# Patient Record
Sex: FEMALE | Race: WHITE | NOT HISPANIC OR LATINO | Employment: UNEMPLOYED | URBAN - METROPOLITAN AREA
[De-identification: names, ages, dates, MRNs, and addresses within clinical notes are randomized per-mention and may not be internally consistent; named-entity substitution may affect disease eponyms.]

---

## 2023-05-30 ENCOUNTER — OFFICE VISIT (OUTPATIENT)
Dept: PRIMARY CARE | Facility: CLINIC | Age: 38
End: 2023-05-30
Payer: COMMERCIAL

## 2023-05-30 VITALS
WEIGHT: 86 LBS | HEIGHT: 58 IN | SYSTOLIC BLOOD PRESSURE: 100 MMHG | BODY MASS INDEX: 18.05 KG/M2 | TEMPERATURE: 98.4 F | DIASTOLIC BLOOD PRESSURE: 68 MMHG

## 2023-05-30 DIAGNOSIS — N92.1 PROLONGED MENSTRUAL CYCLE: ICD-10-CM

## 2023-05-30 DIAGNOSIS — G43.019 INTRACTABLE MIGRAINE WITHOUT AURA AND WITHOUT STATUS MIGRAINOSUS: ICD-10-CM

## 2023-05-30 DIAGNOSIS — Z00.00 WELLNESS EXAMINATION: Primary | ICD-10-CM

## 2023-05-30 PROCEDURE — 1036F TOBACCO NON-USER: CPT | Performed by: FAMILY MEDICINE

## 2023-05-30 PROCEDURE — 99214 OFFICE O/P EST MOD 30 MIN: CPT | Performed by: FAMILY MEDICINE

## 2023-05-30 RX ORDER — SUMATRIPTAN 50 MG/1
50 TABLET, FILM COATED ORAL ONCE AS NEEDED
Qty: 27 TABLET | Refills: 3 | Status: SHIPPED | OUTPATIENT
Start: 2023-05-30 | End: 2023-11-22 | Stop reason: ALTCHOICE

## 2023-05-30 ASSESSMENT — PATIENT HEALTH QUESTIONNAIRE - PHQ9: 1. LITTLE INTEREST OR PLEASURE IN DOING THINGS: NOT AT ALL

## 2023-05-30 NOTE — PROGRESS NOTES
"Chief complaint:   Chief Complaint   Patient presents with    [facility]       HPI:  Cris Walker is a 37 y.o. female who presents for evaluation of headaches starting on the frontal side of her head then move to the bridge of her nose. The happen 3 days prior to the period which lingers for 3 days or so then resolves. The pain is 8/10 intensity. OTC medication does not help.  She started getting Migraines age 19. Her periods last for 15 days since she was young. She has had period with heavy clots. She gets her period monthly or more on average. She has been on OCP in the past. She stopped her OCP months ago.    She has 5 children and has been following with OBGYN (Dr. Ayon) (youngest child is age 3 and oldest is age 12) She is interested in having more children.     She had a ovarian cyst with rupture and needed surgical removal.     Sister in law uses tranexamic acid for heavy menses    She does have gum bleeding with flossing. No bleeding with urination, stooling, tooth brushing, or nosebleeds. She did not have issues with postpartum bleeding (had c- sections).     Physical exam:  /68 (BP Location: Left arm, Patient Position: Sitting)   Temp 36.9 °C (98.4 °F)   Ht 1.473 m (4' 10\")   Wt (!) 39 kg (86 lb)   BMI 17.97 kg/m²   General: NAD, well appearing female  Heart: RRR, no mumur appreciated  Lungs: CTAB, no wheezes, rales, rhonchi  Abdomen: soft, non tender, normoactive BS, no organomegaly  Extremities: No LE edema    Assessment/Plan   Problem List Items Addressed This Visit    None  Visit Diagnoses       Wellness examination    -  Primary    Relevant Orders    Lipid panel    CBC    Comprehensive metabolic panel    Tsh With Reflex To Free T4 If Abnormal    Intractable migraine without aura and without status migrainosus        Relevant Medications    SUMAtriptan (Imitrex) 50 mg tablet        Discussed various options for treatment. Will start with medication therapy. It is unclear if she has " ever been assessed for bleeding disorder, labs ordered to start. Sumatriptan ordered for abortive therapy. Follow up pending results.    Juliana Rangel, DO

## 2023-05-31 ENCOUNTER — LAB (OUTPATIENT)
Dept: LAB | Facility: LAB | Age: 38
End: 2023-05-31
Payer: COMMERCIAL

## 2023-05-31 DIAGNOSIS — N92.1 PROLONGED MENSTRUAL CYCLE: ICD-10-CM

## 2023-05-31 DIAGNOSIS — Z00.00 WELLNESS EXAMINATION: ICD-10-CM

## 2023-05-31 LAB
ACTIVATED PARTIAL THROMBOPLASTIN TIME IN PPP BY COAGULATION ASSAY: 31 SEC (ref 26–39)
ALANINE AMINOTRANSFERASE (SGPT) (U/L) IN SER/PLAS: 26 U/L (ref 7–45)
ALBUMIN (G/DL) IN SER/PLAS: 4.4 G/DL (ref 3.4–5)
ALKALINE PHOSPHATASE (U/L) IN SER/PLAS: 68 U/L (ref 33–110)
ANION GAP IN SER/PLAS: 13 MMOL/L (ref 10–20)
ASPARTATE AMINOTRANSFERASE (SGOT) (U/L) IN SER/PLAS: 24 U/L (ref 9–39)
BILIRUBIN TOTAL (MG/DL) IN SER/PLAS: 0.8 MG/DL (ref 0–1.2)
CALCIUM (MG/DL) IN SER/PLAS: 9.3 MG/DL (ref 8.6–10.3)
CARBON DIOXIDE, TOTAL (MMOL/L) IN SER/PLAS: 24 MMOL/L (ref 21–32)
CHLORIDE (MMOL/L) IN SER/PLAS: 106 MMOL/L (ref 98–107)
CHOLESTEROL (MG/DL) IN SER/PLAS: 205 MG/DL (ref 0–199)
CHOLESTEROL IN HDL (MG/DL) IN SER/PLAS: 58.7 MG/DL
CHOLESTEROL/HDL RATIO: 3.5
CREATININE (MG/DL) IN SER/PLAS: 0.46 MG/DL (ref 0.5–1.05)
ERYTHROCYTE DISTRIBUTION WIDTH (RATIO) BY AUTOMATED COUNT: 13.5 % (ref 11.5–14.5)
ERYTHROCYTE MEAN CORPUSCULAR HEMOGLOBIN CONCENTRATION (G/DL) BY AUTOMATED: 32 G/DL (ref 32–36)
ERYTHROCYTE MEAN CORPUSCULAR VOLUME (FL) BY AUTOMATED COUNT: 92 FL (ref 80–100)
ERYTHROCYTES (10*6/UL) IN BLOOD BY AUTOMATED COUNT: 4.33 X10E12/L (ref 4–5.2)
GFR FEMALE: >90 ML/MIN/1.73M2
GLUCOSE (MG/DL) IN SER/PLAS: 87 MG/DL (ref 74–99)
HEMATOCRIT (%) IN BLOOD BY AUTOMATED COUNT: 40 % (ref 36–46)
HEMOGLOBIN (G/DL) IN BLOOD: 12.8 G/DL (ref 12–16)
INR IN PPP BY COAGULATION ASSAY: 1.1 (ref 0.9–1.1)
LDL: 133 MG/DL (ref 0–99)
LEUKOCYTES (10*3/UL) IN BLOOD BY AUTOMATED COUNT: 4.2 X10E9/L (ref 4.4–11.3)
NRBC (PER 100 WBCS) BY AUTOMATED COUNT: 0 /100 WBC (ref 0–0)
PLATELETS (10*3/UL) IN BLOOD AUTOMATED COUNT: 206 X10E9/L (ref 150–450)
POTASSIUM (MMOL/L) IN SER/PLAS: 4.9 MMOL/L (ref 3.5–5.3)
PROTEIN TOTAL: 6.9 G/DL (ref 6.4–8.2)
PROTHROMBIN TIME (PT) IN PPP BY COAGULATION ASSAY: 13.1 SEC (ref 9.8–13.4)
SODIUM (MMOL/L) IN SER/PLAS: 138 MMOL/L (ref 136–145)
THYROTROPIN (MIU/L) IN SER/PLAS BY DETECTION LIMIT <= 0.05 MIU/L: 2.88 MIU/L (ref 0.44–3.98)
TRIGLYCERIDE (MG/DL) IN SER/PLAS: 66 MG/DL (ref 0–149)
UREA NITROGEN (MG/DL) IN SER/PLAS: 15 MG/DL (ref 6–23)
VLDL: 13 MG/DL (ref 0–40)

## 2023-05-31 PROCEDURE — 85027 COMPLETE CBC AUTOMATED: CPT

## 2023-05-31 PROCEDURE — 84443 ASSAY THYROID STIM HORMONE: CPT

## 2023-05-31 PROCEDURE — 85610 PROTHROMBIN TIME: CPT

## 2023-05-31 PROCEDURE — 36415 COLL VENOUS BLD VENIPUNCTURE: CPT

## 2023-05-31 PROCEDURE — 85730 THROMBOPLASTIN TIME PARTIAL: CPT

## 2023-05-31 PROCEDURE — 80061 LIPID PANEL: CPT

## 2023-05-31 PROCEDURE — 80053 COMPREHEN METABOLIC PANEL: CPT

## 2023-06-01 ENCOUNTER — TELEPHONE (OUTPATIENT)
Dept: PRIMARY CARE | Facility: CLINIC | Age: 38
End: 2023-06-01
Payer: COMMERCIAL

## 2023-06-01 DIAGNOSIS — N92.6 ABNORMAL MENSES: Primary | ICD-10-CM

## 2023-06-01 NOTE — TELEPHONE ENCOUNTER
Labs ok though I would like her to have evaluation by hematology to assess for cause for her prolonged bleeding.     Please try the Sumatriptan with her next migraine and follow up with me after that to discuss alternative options.

## 2023-07-14 PROBLEM — E05.90 HYPERTHYROIDISM: Status: ACTIVE | Noted: 2023-07-14

## 2023-07-14 PROBLEM — E06.3 HASHIMOTO'S THYROIDITIS: Status: ACTIVE | Noted: 2023-07-14

## 2023-07-14 PROBLEM — O21.9 NAUSEA AND VOMITING IN PREGNANCY (HHS-HCC): Status: ACTIVE | Noted: 2023-07-14

## 2023-07-14 PROBLEM — R30.9 PAIN WITH URINATION: Status: ACTIVE | Noted: 2023-07-14

## 2023-07-14 PROBLEM — N39.0 URINARY TRACT INFECTION: Status: ACTIVE | Noted: 2023-07-14

## 2023-07-14 PROBLEM — R06.02 SHORTNESS OF BREATH: Status: ACTIVE | Noted: 2023-07-14

## 2023-07-14 PROBLEM — E05.00 GRAVES DISEASE: Status: ACTIVE | Noted: 2023-07-14

## 2023-07-14 PROBLEM — R00.2 PALPITATIONS: Status: ACTIVE | Noted: 2023-07-14

## 2023-07-14 PROBLEM — N99.81 INTRAOPERATIVE BLADDER INJURY: Status: ACTIVE | Noted: 2023-07-14

## 2023-07-14 PROBLEM — R39.15 URGENCY OF URINATION: Status: ACTIVE | Noted: 2023-07-14

## 2023-07-14 PROBLEM — N93.9 ABNORMAL UTERINE BLEEDING (AUB): Status: ACTIVE | Noted: 2023-07-14

## 2023-07-14 RX ORDER — LEVONORGESTREL/ETHIN.ESTRADIOL 0.1-0.02MG
1 TABLET ORAL DAILY
COMMUNITY
Start: 2022-08-09 | End: 2023-10-18 | Stop reason: WASHOUT

## 2023-08-29 ENCOUNTER — APPOINTMENT (OUTPATIENT)
Dept: PRIMARY CARE | Facility: CLINIC | Age: 38
End: 2023-08-29
Payer: COMMERCIAL

## 2023-09-15 LAB
ABO GROUP (TYPE) IN BLOOD: NORMAL
ANTIBODY SCREEN: NORMAL
ERYTHROCYTE DISTRIBUTION WIDTH (RATIO) BY AUTOMATED COUNT: 13.3 % (ref 11.5–14.5)
ERYTHROCYTE MEAN CORPUSCULAR HEMOGLOBIN CONCENTRATION (G/DL) BY AUTOMATED: 32.8 G/DL (ref 32–36)
ERYTHROCYTE MEAN CORPUSCULAR VOLUME (FL) BY AUTOMATED COUNT: 90 FL (ref 80–100)
ERYTHROCYTES (10*6/UL) IN BLOOD BY AUTOMATED COUNT: 4.18 X10E12/L (ref 4–5.2)
ESTIMATED AVERAGE GLUCOSE FOR HBA1C: 94 MG/DL
HEMATOCRIT (%) IN BLOOD BY AUTOMATED COUNT: 37.5 % (ref 36–46)
HEMOGLOBIN (G/DL) IN BLOOD: 12.3 G/DL (ref 12–16)
HEMOGLOBIN A1C/HEMOGLOBIN TOTAL IN BLOOD: 4.9 %
HEPATITIS B VIRUS SURFACE AG PRESENCE IN SERUM: NONREACTIVE
HEPATITIS C VIRUS AB PRESENCE IN SERUM: NONREACTIVE
HIV 1/ 2 AG/AB SCREEN: NONREACTIVE
LEUKOCYTES (10*3/UL) IN BLOOD BY AUTOMATED COUNT: 5.9 X10E9/L (ref 4.4–11.3)
PLATELETS (10*3/UL) IN BLOOD AUTOMATED COUNT: 216 X10E9/L (ref 150–450)
REFLEX ADDED, ANEMIA PANEL: NORMAL
RH FACTOR: NORMAL
RUBELLA VIRUS IGG AB: POSITIVE
THYROTROPIN (MIU/L) IN SER/PLAS BY DETECTION LIMIT <= 0.05 MIU/L: 0.47 MIU/L (ref 0.44–3.98)

## 2023-09-16 LAB
CHLAMYDIA TRACH., AMPLIFIED: NEGATIVE
N. GONORRHEA, AMPLIFIED: NEGATIVE
SYPHILIS TOTAL AB: NONREACTIVE

## 2023-09-17 LAB — URINE CULTURE: NORMAL

## 2023-09-23 RX ORDER — ONDANSETRON 4 MG/1
TABLET, ORALLY DISINTEGRATING ORAL EVERY 6 HOURS
COMMUNITY
Start: 2023-08-25 | End: 2023-11-22 | Stop reason: ALTCHOICE

## 2023-09-23 RX ORDER — METOCLOPRAMIDE 5 MG/1
TABLET ORAL
COMMUNITY
Start: 2023-09-05 | End: 2023-11-22 | Stop reason: ALTCHOICE

## 2023-09-23 RX ORDER — DOXYLAMINE SUCCINATE AND PYRIDOXINE HYDROCHLORIDE, DELAYED RELEASE TABLETS 10 MG/10 MG 10; 10 MG/1; MG/1
TABLET, DELAYED RELEASE ORAL
COMMUNITY
Start: 2023-09-05 | End: 2023-10-18 | Stop reason: WASHOUT

## 2023-10-11 ENCOUNTER — ANCILLARY PROCEDURE (OUTPATIENT)
Dept: RADIOLOGY | Facility: CLINIC | Age: 38
End: 2023-10-11
Payer: COMMERCIAL

## 2023-10-11 DIAGNOSIS — Z34.81 ENCOUNTER FOR SUPERVISION OF OTHER NORMAL PREGNANCY, FIRST TRIMESTER (HHS-HCC): ICD-10-CM

## 2023-10-11 PROCEDURE — 76813 OB US NUCHAL MEAS 1 GEST: CPT | Performed by: OBSTETRICS & GYNECOLOGY

## 2023-10-11 PROCEDURE — 76813 OB US NUCHAL MEAS 1 GEST: CPT

## 2023-10-17 PROBLEM — Z98.891 H/O CESAREAN SECTION: Status: ACTIVE | Noted: 2023-10-17

## 2023-10-17 PROBLEM — E06.3 HASHIMOTO'S THYROIDITIS: Status: RESOLVED | Noted: 2023-07-14 | Resolved: 2023-10-17

## 2023-10-17 PROBLEM — R39.15 URGENCY OF URINATION: Status: RESOLVED | Noted: 2023-07-14 | Resolved: 2023-10-17

## 2023-10-17 PROBLEM — N39.0 URINARY TRACT INFECTION: Status: RESOLVED | Noted: 2023-07-14 | Resolved: 2023-10-17

## 2023-10-17 PROBLEM — E05.00 GRAVES DISEASE: Status: RESOLVED | Noted: 2023-07-14 | Resolved: 2023-10-17

## 2023-10-17 PROBLEM — N93.9 ABNORMAL UTERINE BLEEDING (AUB): Status: RESOLVED | Noted: 2023-07-14 | Resolved: 2023-10-17

## 2023-10-17 PROBLEM — R06.02 SHORTNESS OF BREATH: Status: RESOLVED | Noted: 2023-07-14 | Resolved: 2023-10-17

## 2023-10-17 PROBLEM — R00.2 PALPITATIONS: Status: RESOLVED | Noted: 2023-07-14 | Resolved: 2023-10-17

## 2023-10-17 PROBLEM — R30.9 PAIN WITH URINATION: Status: RESOLVED | Noted: 2023-07-14 | Resolved: 2023-10-17

## 2023-10-17 PROBLEM — Z34.80 SUPERVISION OF OTHER NORMAL PREGNANCY, ANTEPARTUM (HHS-HCC): Status: ACTIVE | Noted: 2023-10-17

## 2023-10-18 ENCOUNTER — ROUTINE PRENATAL (OUTPATIENT)
Dept: OBSTETRICS AND GYNECOLOGY | Facility: CLINIC | Age: 38
End: 2023-10-18
Payer: COMMERCIAL

## 2023-10-18 VITALS — SYSTOLIC BLOOD PRESSURE: 98 MMHG | WEIGHT: 93.5 LBS | BODY MASS INDEX: 19.54 KG/M2 | DIASTOLIC BLOOD PRESSURE: 60 MMHG

## 2023-10-18 DIAGNOSIS — Z3A.14 14 WEEKS GESTATION OF PREGNANCY (HHS-HCC): Primary | ICD-10-CM

## 2023-10-18 DIAGNOSIS — Z34.80 SUPERVISION OF OTHER NORMAL PREGNANCY, ANTEPARTUM (HHS-HCC): ICD-10-CM

## 2023-10-18 PROCEDURE — 99213 OFFICE O/P EST LOW 20 MIN: CPT | Performed by: OBSTETRICS & GYNECOLOGY

## 2023-10-18 NOTE — PROGRESS NOTES
Routine ob at 14.2.  Normal NT scan, declines genetics.  Got her flu shot at her new ob visit.  Has anatomy scan scheduled. RTC 4 wks.   No

## 2023-11-20 ENCOUNTER — ANCILLARY PROCEDURE (OUTPATIENT)
Dept: RADIOLOGY | Facility: CLINIC | Age: 38
End: 2023-11-20
Payer: COMMERCIAL

## 2023-11-20 DIAGNOSIS — Z36.89 SCREENING, ANTENATAL, FOR FETAL ANATOMIC SURVEY (HHS-HCC): ICD-10-CM

## 2023-11-20 PROCEDURE — 76811 OB US DETAILED SNGL FETUS: CPT

## 2023-11-20 PROCEDURE — 76811 OB US DETAILED SNGL FETUS: CPT | Performed by: OBSTETRICS & GYNECOLOGY

## 2023-11-22 ENCOUNTER — ROUTINE PRENATAL (OUTPATIENT)
Dept: OBSTETRICS AND GYNECOLOGY | Facility: CLINIC | Age: 38
End: 2023-11-22
Payer: COMMERCIAL

## 2023-11-22 VITALS — DIASTOLIC BLOOD PRESSURE: 60 MMHG | WEIGHT: 97.38 LBS | SYSTOLIC BLOOD PRESSURE: 90 MMHG | BODY MASS INDEX: 20.35 KG/M2

## 2023-11-22 DIAGNOSIS — Z3A.19 19 WEEKS GESTATION OF PREGNANCY (HHS-HCC): Primary | ICD-10-CM

## 2023-11-22 PROCEDURE — 99213 OFFICE O/P EST LOW 20 MIN: CPT | Performed by: ADVANCED PRACTICE MIDWIFE

## 2023-11-22 NOTE — PROGRESS NOTES
Subjective     Cris Walker is a 38 y.o.  at 19w2d with a working estimated date of delivery of 4/15/2024, by Last Menstrual Period who presents for a routine prenatal visit. Endorses good fetal movement, denies vaginal bleeding, leakage of fluid, or painful cramping/contractions.    No OB concerns  Anatomy U/S 2 days ago, WNLs    Her pregnancy is complicated by:  Pregnancy Problems (from 09/15/23 to present)       Problem Noted Resolved    14 weeks gestation of pregnancy 10/18/2023 by Jaclyn Ayon MD No    Supervision of other normal pregnancy, antepartum 10/17/2023 by Jaclyn Ayon MD No    Overview Signed 10/17/2023  9:17 PM by Jaclyn Ayon MD     -s/p flu shot 9/15/23            Objective   Physical Exam:   Weight: (!) 44.2 kg (97 lb 6 oz)  TW.167 kg (9 lb 3 oz)  Expected Total Weight Gain: 12.5 kg (27 lb)-18 kg (39 lb)   Pregravid BMI: 18.44  BP: 90/60  Fetal Heart Rate: 155               Postpartum Depression: Not on file        Prenatal Labs  Lab Results   Component Value Date    HGB 12.3 09/15/2023    HCT 37.5 09/15/2023     09/15/2023    ABO O 09/15/2023    LABRH POS 09/15/2023    NEISSGONOAMP NEGATIVE 09/15/2023    CHLAMTRACAMP NEGATIVE 09/15/2023    SYPHT NONREACTIVE 09/15/2023    HEPBSAG NONREACTIVE 09/15/2023    HIV1X2 NONREACTIVE 09/15/2023    URINECULTURE NO SIGNIFICANT GROWTH. 09/15/2023     Lab Results   Component Value Date    GLUC1P 105 2020       Assessment/Plan   38 y.o.  at 19w2d  Continue prenatal vitamins  1 hr and CBC next visit    Follow up in 5 week(s) for a routine prenatal visit or sooner as needed.    TAWANNA Min-SHERICE

## 2023-11-29 ENCOUNTER — HOSPITAL ENCOUNTER (INPATIENT)
Facility: HOSPITAL | Age: 38
LOS: 1 days | Discharge: HOME | End: 2023-11-29
Attending: OBSTETRICS & GYNECOLOGY | Admitting: OBSTETRICS & GYNECOLOGY
Payer: COMMERCIAL

## 2023-11-29 ENCOUNTER — ANESTHESIA EVENT (OUTPATIENT)
Dept: OBSTETRICS AND GYNECOLOGY | Facility: HOSPITAL | Age: 38
End: 2023-11-29
Payer: COMMERCIAL

## 2023-11-29 ENCOUNTER — ANESTHESIA (OUTPATIENT)
Dept: OBSTETRICS AND GYNECOLOGY | Facility: HOSPITAL | Age: 38
End: 2023-11-29
Payer: COMMERCIAL

## 2023-11-29 ENCOUNTER — TELEPHONE (OUTPATIENT)
Dept: OBSTETRICS AND GYNECOLOGY | Facility: CLINIC | Age: 38
End: 2023-11-29

## 2023-11-29 VITALS
RESPIRATION RATE: 18 BRPM | HEIGHT: 59 IN | BODY MASS INDEX: 19.35 KG/M2 | DIASTOLIC BLOOD PRESSURE: 58 MMHG | OXYGEN SATURATION: 96 % | HEART RATE: 93 BPM | SYSTOLIC BLOOD PRESSURE: 113 MMHG | TEMPERATURE: 98.6 F | WEIGHT: 96 LBS

## 2023-11-29 DIAGNOSIS — R52 POSTPARTUM PAIN (HHS-HCC): Primary | ICD-10-CM

## 2023-11-29 DIAGNOSIS — O42.112: ICD-10-CM

## 2023-11-29 LAB
ABO GROUP (TYPE) IN BLOOD: NORMAL
ALBUMIN SERPL BCP-MCNC: 3.8 G/DL (ref 3.4–5)
ALP SERPL-CCNC: 114 U/L (ref 33–110)
ALT SERPL W P-5'-P-CCNC: 14 U/L (ref 7–45)
ANION GAP SERPL CALC-SCNC: 17 MMOL/L (ref 10–20)
ANTIBODY SCREEN: NORMAL
AST SERPL W P-5'-P-CCNC: 18 U/L (ref 9–39)
BILIRUB SERPL-MCNC: 0.7 MG/DL (ref 0–1.2)
BUN SERPL-MCNC: 6 MG/DL (ref 6–23)
CALCIUM SERPL-MCNC: 9.3 MG/DL (ref 8.6–10.3)
CHLORIDE SERPL-SCNC: 102 MMOL/L (ref 98–107)
CO2 SERPL-SCNC: 19 MMOL/L (ref 21–32)
CREAT SERPL-MCNC: 0.26 MG/DL (ref 0.5–1.05)
ERYTHROCYTE [DISTWIDTH] IN BLOOD BY AUTOMATED COUNT: 14.1 % (ref 11.5–14.5)
FIBRINOGEN PPP-MCNC: 583 MG/DL (ref 200–400)
GFR SERPL CREATININE-BSD FRML MDRD: >90 ML/MIN/1.73M*2
GLUCOSE SERPL-MCNC: 107 MG/DL (ref 74–99)
HCT VFR BLD AUTO: 37.3 % (ref 36–46)
HGB BLD-MCNC: 12.5 G/DL (ref 12–16)
HOLD SPECIMEN: NORMAL
MCH RBC QN AUTO: 30 PG (ref 26–34)
MCHC RBC AUTO-ENTMCNC: 33.5 G/DL (ref 32–36)
MCV RBC AUTO: 89 FL (ref 80–100)
NRBC BLD-RTO: 0 /100 WBCS (ref 0–0)
PLATELET # BLD AUTO: 175 X10*3/UL (ref 150–450)
POTASSIUM SERPL-SCNC: 3.4 MMOL/L (ref 3.5–5.3)
PROT SERPL-MCNC: 7.2 G/DL (ref 6.4–8.2)
RBC # BLD AUTO: 4.17 X10*6/UL (ref 4–5.2)
RH FACTOR (ANTIGEN D): NORMAL
SODIUM SERPL-SCNC: 135 MMOL/L (ref 136–145)
T PALLIDUM AB SER QL: NONREACTIVE
WBC # BLD AUTO: 15.7 X10*3/UL (ref 4.4–11.3)

## 2023-11-29 PROCEDURE — 1120000001 HC OB PRIVATE ROOM DAILY

## 2023-11-29 PROCEDURE — 88305 TISSUE EXAM BY PATHOLOGIST: CPT | Performed by: PATHOLOGY

## 2023-11-29 PROCEDURE — 59409 OBSTETRICAL CARE: CPT | Performed by: OBSTETRICS & GYNECOLOGY

## 2023-11-29 PROCEDURE — 2500000001 HC RX 250 WO HCPCS SELF ADMINISTERED DRUGS (ALT 637 FOR MEDICARE OP): Performed by: OBSTETRICS & GYNECOLOGY

## 2023-11-29 PROCEDURE — 86901 BLOOD TYPING SEROLOGIC RH(D): CPT | Performed by: OBSTETRICS & GYNECOLOGY

## 2023-11-29 PROCEDURE — 10D17ZZ EXTRACTION OF PRODUCTS OF CONCEPTION, RETAINED, VIA NATURAL OR ARTIFICIAL OPENING: ICD-10-PCS | Performed by: OBSTETRICS & GYNECOLOGY

## 2023-11-29 PROCEDURE — 7210000002 HC LABOR PER HOUR

## 2023-11-29 PROCEDURE — 80053 COMPREHEN METABOLIC PANEL: CPT | Performed by: OBSTETRICS & GYNECOLOGY

## 2023-11-29 PROCEDURE — 86780 TREPONEMA PALLIDUM: CPT | Mod: STJLAB | Performed by: OBSTETRICS & GYNECOLOGY

## 2023-11-29 PROCEDURE — 88305 TISSUE EXAM BY PATHOLOGIST: CPT | Mod: TC,SUR,STJLAB | Performed by: OBSTETRICS & GYNECOLOGY

## 2023-11-29 PROCEDURE — 85384 FIBRINOGEN ACTIVITY: CPT | Performed by: OBSTETRICS & GYNECOLOGY

## 2023-11-29 PROCEDURE — 7100000016 HC LABOR RECOVERY PER HOUR

## 2023-11-29 PROCEDURE — 85027 COMPLETE CBC AUTOMATED: CPT | Performed by: OBSTETRICS & GYNECOLOGY

## 2023-11-29 PROCEDURE — 2500000004 HC RX 250 GENERAL PHARMACY W/ HCPCS (ALT 636 FOR OP/ED)

## 2023-11-29 PROCEDURE — 36415 COLL VENOUS BLD VENIPUNCTURE: CPT | Performed by: OBSTETRICS & GYNECOLOGY

## 2023-11-29 PROCEDURE — 88305 TISSUE EXAM BY PATHOLOGIST: CPT | Mod: TC,STJLAB | Performed by: OBSTETRICS & GYNECOLOGY

## 2023-11-29 RX ORDER — NIFEDIPINE 10 MG/1
10 CAPSULE ORAL ONCE AS NEEDED
Status: DISCONTINUED | OUTPATIENT
Start: 2023-11-29 | End: 2023-11-30 | Stop reason: HOSPADM

## 2023-11-29 RX ORDER — MISOPROSTOL 200 UG/1
800 TABLET ORAL ONCE AS NEEDED
Status: DISCONTINUED | OUTPATIENT
Start: 2023-11-29 | End: 2023-11-29

## 2023-11-29 RX ORDER — TERBUTALINE SULFATE 1 MG/ML
0.25 INJECTION SUBCUTANEOUS ONCE AS NEEDED
Status: DISCONTINUED | OUTPATIENT
Start: 2023-11-29 | End: 2023-11-29

## 2023-11-29 RX ORDER — MORPHINE SULFATE 2 MG/ML
2 INJECTION, SOLUTION INTRAMUSCULAR; INTRAVENOUS ONCE
Status: COMPLETED | OUTPATIENT
Start: 2023-11-29 | End: 2023-11-29

## 2023-11-29 RX ORDER — LOPERAMIDE HYDROCHLORIDE 2 MG/1
4 CAPSULE ORAL EVERY 2 HOUR PRN
Status: DISCONTINUED | OUTPATIENT
Start: 2023-11-29 | End: 2023-11-29

## 2023-11-29 RX ORDER — FENTANYL/ROPIVACAINE/NS/PF 2MCG/ML-.2
PLASTIC BAG, INJECTION (ML) INJECTION
Status: DISCONTINUED
Start: 2023-11-29 | End: 2023-11-29 | Stop reason: HOSPADM

## 2023-11-29 RX ORDER — LIDOCAINE HYDROCHLORIDE 10 MG/ML
30 INJECTION INFILTRATION; PERINEURAL ONCE AS NEEDED
Status: DISCONTINUED | OUTPATIENT
Start: 2023-11-29 | End: 2023-11-29

## 2023-11-29 RX ORDER — HYDRALAZINE HYDROCHLORIDE 20 MG/ML
5 INJECTION INTRAMUSCULAR; INTRAVENOUS ONCE AS NEEDED
Status: DISCONTINUED | OUTPATIENT
Start: 2023-11-29 | End: 2023-11-29

## 2023-11-29 RX ORDER — TRANEXAMIC ACID 100 MG/ML
1000 INJECTION, SOLUTION INTRAVENOUS ONCE AS NEEDED
Status: DISCONTINUED | OUTPATIENT
Start: 2023-11-29 | End: 2023-11-30 | Stop reason: HOSPADM

## 2023-11-29 RX ORDER — OXYTOCIN/0.9 % SODIUM CHLORIDE 30/500 ML
60 PLASTIC BAG, INJECTION (ML) INTRAVENOUS ONCE AS NEEDED
Status: DISCONTINUED | OUTPATIENT
Start: 2023-11-29 | End: 2023-11-29

## 2023-11-29 RX ORDER — ACETAMINOPHEN 500 MG
1000 TABLET ORAL EVERY 6 HOURS PRN
COMMUNITY
Start: 2023-11-29 | End: 2024-03-05 | Stop reason: ALTCHOICE

## 2023-11-29 RX ORDER — OXYTOCIN/0.9 % SODIUM CHLORIDE 30/500 ML
60 PLASTIC BAG, INJECTION (ML) INTRAVENOUS ONCE AS NEEDED
Status: DISCONTINUED | OUTPATIENT
Start: 2023-11-29 | End: 2023-11-30 | Stop reason: HOSPADM

## 2023-11-29 RX ORDER — LABETALOL HYDROCHLORIDE 5 MG/ML
20 INJECTION, SOLUTION INTRAVENOUS ONCE AS NEEDED
Status: DISCONTINUED | OUTPATIENT
Start: 2023-11-29 | End: 2023-11-30 | Stop reason: HOSPADM

## 2023-11-29 RX ORDER — ONDANSETRON HYDROCHLORIDE 2 MG/ML
4 INJECTION, SOLUTION INTRAVENOUS EVERY 6 HOURS PRN
Status: DISCONTINUED | OUTPATIENT
Start: 2023-11-29 | End: 2023-11-30 | Stop reason: HOSPADM

## 2023-11-29 RX ORDER — OXYTOCIN/0.9 % SODIUM CHLORIDE 30/500 ML
PLASTIC BAG, INJECTION (ML) INTRAVENOUS
Status: DISCONTINUED
Start: 2023-11-29 | End: 2023-11-29 | Stop reason: HOSPADM

## 2023-11-29 RX ORDER — MISOPROSTOL 200 UG/1
800 TABLET ORAL ONCE AS NEEDED
Status: DISCONTINUED | OUTPATIENT
Start: 2023-11-29 | End: 2023-11-30 | Stop reason: HOSPADM

## 2023-11-29 RX ORDER — MORPHINE SULFATE 2 MG/ML
INJECTION, SOLUTION INTRAMUSCULAR; INTRAVENOUS
Status: COMPLETED
Start: 2023-11-29 | End: 2023-11-29

## 2023-11-29 RX ORDER — DOXYCYCLINE 100 MG/1
100 CAPSULE ORAL 2 TIMES DAILY
Qty: 14 CAPSULE | Refills: 0 | Status: SHIPPED | OUTPATIENT
Start: 2023-11-29 | End: 2023-12-06

## 2023-11-29 RX ORDER — LOPERAMIDE HYDROCHLORIDE 2 MG/1
4 CAPSULE ORAL EVERY 2 HOUR PRN
Status: DISCONTINUED | OUTPATIENT
Start: 2023-11-29 | End: 2023-11-30 | Stop reason: HOSPADM

## 2023-11-29 RX ORDER — LORAZEPAM 0.5 MG/1
0.5 TABLET ORAL EVERY 6 HOURS PRN
Qty: 5 TABLET | Refills: 0 | Status: SHIPPED | OUTPATIENT
Start: 2023-11-29 | End: 2024-03-05 | Stop reason: ALTCHOICE

## 2023-11-29 RX ORDER — SODIUM CHLORIDE, SODIUM LACTATE, POTASSIUM CHLORIDE, CALCIUM CHLORIDE 600; 310; 30; 20 MG/100ML; MG/100ML; MG/100ML; MG/100ML
125 INJECTION, SOLUTION INTRAVENOUS CONTINUOUS
Status: DISCONTINUED | OUTPATIENT
Start: 2023-11-29 | End: 2023-11-29

## 2023-11-29 RX ORDER — OXYTOCIN 10 [USP'U]/ML
10 INJECTION, SOLUTION INTRAMUSCULAR; INTRAVENOUS ONCE AS NEEDED
Status: DISCONTINUED | OUTPATIENT
Start: 2023-11-29 | End: 2023-11-30 | Stop reason: HOSPADM

## 2023-11-29 RX ORDER — OXYTOCIN 10 [USP'U]/ML
10 INJECTION, SOLUTION INTRAMUSCULAR; INTRAVENOUS ONCE AS NEEDED
Status: DISCONTINUED | OUTPATIENT
Start: 2023-11-29 | End: 2023-11-29

## 2023-11-29 RX ORDER — DIPHENHYDRAMINE HCL 25 MG
25 CAPSULE ORAL EVERY 6 HOURS PRN
Status: DISCONTINUED | OUTPATIENT
Start: 2023-11-29 | End: 2023-11-30 | Stop reason: HOSPADM

## 2023-11-29 RX ORDER — METOCLOPRAMIDE 10 MG/1
10 TABLET ORAL EVERY 6 HOURS PRN
Status: DISCONTINUED | OUTPATIENT
Start: 2023-11-29 | End: 2023-11-29

## 2023-11-29 RX ORDER — METHYLERGONOVINE MALEATE 0.2 MG/ML
0.2 INJECTION INTRAVENOUS ONCE AS NEEDED
Status: DISCONTINUED | OUTPATIENT
Start: 2023-11-29 | End: 2023-11-29

## 2023-11-29 RX ORDER — HYDRALAZINE HYDROCHLORIDE 20 MG/ML
5 INJECTION INTRAMUSCULAR; INTRAVENOUS ONCE AS NEEDED
Status: DISCONTINUED | OUTPATIENT
Start: 2023-11-29 | End: 2023-11-30 | Stop reason: HOSPADM

## 2023-11-29 RX ORDER — ADHESIVE BANDAGE
10 BANDAGE TOPICAL
Status: DISCONTINUED | OUTPATIENT
Start: 2023-11-29 | End: 2023-11-30 | Stop reason: HOSPADM

## 2023-11-29 RX ORDER — IBUPROFEN 200 MG
600 TABLET ORAL EVERY 6 HOURS PRN
COMMUNITY
Start: 2023-11-29 | End: 2024-01-05 | Stop reason: HOSPADM

## 2023-11-29 RX ORDER — LIDOCAINE 560 MG/1
1 PATCH PERCUTANEOUS; TOPICAL; TRANSDERMAL
Status: DISCONTINUED | OUTPATIENT
Start: 2023-11-29 | End: 2023-11-30 | Stop reason: HOSPADM

## 2023-11-29 RX ORDER — ONDANSETRON HYDROCHLORIDE 2 MG/ML
4 INJECTION, SOLUTION INTRAVENOUS EVERY 6 HOURS PRN
Status: DISCONTINUED | OUTPATIENT
Start: 2023-11-29 | End: 2023-11-29

## 2023-11-29 RX ORDER — LABETALOL HYDROCHLORIDE 5 MG/ML
20 INJECTION, SOLUTION INTRAVENOUS ONCE AS NEEDED
Status: DISCONTINUED | OUTPATIENT
Start: 2023-11-29 | End: 2023-11-29

## 2023-11-29 RX ORDER — ACETAMINOPHEN 325 MG/1
975 TABLET ORAL EVERY 6 HOURS
Status: DISCONTINUED | OUTPATIENT
Start: 2023-11-29 | End: 2023-11-30 | Stop reason: HOSPADM

## 2023-11-29 RX ORDER — CARBOPROST TROMETHAMINE 250 UG/ML
250 INJECTION, SOLUTION INTRAMUSCULAR ONCE AS NEEDED
Status: DISCONTINUED | OUTPATIENT
Start: 2023-11-29 | End: 2023-11-29

## 2023-11-29 RX ORDER — SIMETHICONE 80 MG
80 TABLET,CHEWABLE ORAL 4 TIMES DAILY PRN
Status: DISCONTINUED | OUTPATIENT
Start: 2023-11-29 | End: 2023-11-30 | Stop reason: HOSPADM

## 2023-11-29 RX ORDER — NIFEDIPINE 10 MG/1
10 CAPSULE ORAL ONCE AS NEEDED
Status: DISCONTINUED | OUTPATIENT
Start: 2023-11-29 | End: 2023-11-29

## 2023-11-29 RX ORDER — BISACODYL 10 MG/1
10 SUPPOSITORY RECTAL DAILY PRN
Status: DISCONTINUED | OUTPATIENT
Start: 2023-11-29 | End: 2023-11-30 | Stop reason: HOSPADM

## 2023-11-29 RX ORDER — POLYETHYLENE GLYCOL 3350 17 G/17G
17 POWDER, FOR SOLUTION ORAL 2 TIMES DAILY PRN
Status: DISCONTINUED | OUTPATIENT
Start: 2023-11-29 | End: 2023-11-30 | Stop reason: HOSPADM

## 2023-11-29 RX ORDER — ONDANSETRON 4 MG/1
4 TABLET, FILM COATED ORAL EVERY 6 HOURS PRN
Status: DISCONTINUED | OUTPATIENT
Start: 2023-11-29 | End: 2023-11-30 | Stop reason: HOSPADM

## 2023-11-29 RX ORDER — IBUPROFEN 600 MG/1
600 TABLET ORAL EVERY 6 HOURS
Status: DISCONTINUED | OUTPATIENT
Start: 2023-11-29 | End: 2023-11-30 | Stop reason: HOSPADM

## 2023-11-29 RX ORDER — CARBOPROST TROMETHAMINE 250 UG/ML
250 INJECTION, SOLUTION INTRAMUSCULAR ONCE AS NEEDED
Status: DISCONTINUED | OUTPATIENT
Start: 2023-11-29 | End: 2023-11-30 | Stop reason: HOSPADM

## 2023-11-29 RX ORDER — TRANEXAMIC ACID 100 MG/ML
1000 INJECTION, SOLUTION INTRAVENOUS ONCE AS NEEDED
Status: DISCONTINUED | OUTPATIENT
Start: 2023-11-29 | End: 2023-11-29

## 2023-11-29 RX ORDER — DIPHENHYDRAMINE HYDROCHLORIDE 50 MG/ML
25 INJECTION INTRAMUSCULAR; INTRAVENOUS EVERY 6 HOURS PRN
Status: DISCONTINUED | OUTPATIENT
Start: 2023-11-29 | End: 2023-11-30 | Stop reason: HOSPADM

## 2023-11-29 RX ORDER — METHYLERGONOVINE MALEATE 0.2 MG/ML
0.2 INJECTION INTRAVENOUS ONCE AS NEEDED
Status: DISCONTINUED | OUTPATIENT
Start: 2023-11-29 | End: 2023-11-30 | Stop reason: HOSPADM

## 2023-11-29 RX ORDER — METOCLOPRAMIDE HYDROCHLORIDE 5 MG/ML
10 INJECTION INTRAMUSCULAR; INTRAVENOUS EVERY 6 HOURS PRN
Status: DISCONTINUED | OUTPATIENT
Start: 2023-11-29 | End: 2023-11-29

## 2023-11-29 RX ORDER — ONDANSETRON 4 MG/1
4 TABLET, FILM COATED ORAL EVERY 6 HOURS PRN
Status: DISCONTINUED | OUTPATIENT
Start: 2023-11-29 | End: 2023-11-29

## 2023-11-29 RX ADMIN — ACETAMINOPHEN 975 MG: 325 TABLET ORAL at 19:22

## 2023-11-29 RX ADMIN — ACETAMINOPHEN 975 MG: 325 TABLET ORAL at 13:45

## 2023-11-29 RX ADMIN — IBUPROFEN 600 MG: 600 TABLET, FILM COATED ORAL at 13:45

## 2023-11-29 RX ADMIN — IBUPROFEN 600 MG: 600 TABLET, FILM COATED ORAL at 19:22

## 2023-11-29 RX ADMIN — MORPHINE SULFATE 2 MG: 2 INJECTION, SOLUTION INTRAMUSCULAR; INTRAVENOUS at 11:06

## 2023-11-29 SDOH — HEALTH STABILITY: MENTAL HEALTH: CURRENT SMOKER: 0

## 2023-11-29 SDOH — SOCIAL STABILITY: SOCIAL INSECURITY: DOES ANYONE TRY TO KEEP YOU FROM HAVING/CONTACTING OTHER FRIENDS OR DOING THINGS OUTSIDE YOUR HOME?: NO

## 2023-11-29 SDOH — SOCIAL STABILITY: SOCIAL INSECURITY: ABUSE SCREEN: ADULT

## 2023-11-29 SDOH — ECONOMIC STABILITY: HOUSING INSECURITY: DO YOU FEEL UNSAFE GOING BACK TO THE PLACE WHERE YOU ARE LIVING?: NO

## 2023-11-29 SDOH — SOCIAL STABILITY: SOCIAL INSECURITY: PHYSICAL ABUSE: DENIES

## 2023-11-29 SDOH — HEALTH STABILITY: MENTAL HEALTH: WISH TO BE DEAD (PAST 1 MONTH): NO

## 2023-11-29 SDOH — SOCIAL STABILITY: SOCIAL INSECURITY: VERBAL ABUSE: DENIES

## 2023-11-29 SDOH — SOCIAL STABILITY: SOCIAL INSECURITY: DO YOU FEEL ANYONE HAS EXPLOITED OR TAKEN ADVANTAGE OF YOU FINANCIALLY OR OF YOUR PERSONAL PROPERTY?: NO

## 2023-11-29 SDOH — SOCIAL STABILITY: SOCIAL INSECURITY: HAS ANYONE EVER THREATENED TO HURT YOUR FAMILY OR YOUR PETS?: NO

## 2023-11-29 SDOH — HEALTH STABILITY: MENTAL HEALTH: NON-SPECIFIC ACTIVE SUICIDAL THOUGHTS (PAST 1 MONTH): NO

## 2023-11-29 SDOH — SOCIAL STABILITY: SOCIAL INSECURITY: ARE YOU OR HAVE YOU BEEN THREATENED OR ABUSED PHYSICALLY, EMOTIONALLY, OR SEXUALLY BY ANYONE?: NO

## 2023-11-29 SDOH — HEALTH STABILITY: MENTAL HEALTH: SUICIDAL BEHAVIOR (LIFETIME): NO

## 2023-11-29 SDOH — SOCIAL STABILITY: SOCIAL INSECURITY: ARE THERE ANY APPARENT SIGNS OF INJURIES/BEHAVIORS THAT COULD BE RELATED TO ABUSE/NEGLECT?: NO

## 2023-11-29 SDOH — SOCIAL STABILITY: SOCIAL INSECURITY: HAVE YOU HAD THOUGHTS OF HARMING ANYONE ELSE?: NO

## 2023-11-29 SDOH — HEALTH STABILITY: MENTAL HEALTH: WERE YOU ABLE TO COMPLETE ALL THE BEHAVIORAL HEALTH SCREENINGS?: YES

## 2023-11-29 ASSESSMENT — LIFESTYLE VARIABLES
AUDIT-C TOTAL SCORE: 0
HOW MANY STANDARD DRINKS CONTAINING ALCOHOL DO YOU HAVE ON A TYPICAL DAY: PATIENT DOES NOT DRINK
AUDIT-C TOTAL SCORE: 0
HOW OFTEN DO YOU HAVE 6 OR MORE DRINKS ON ONE OCCASION: NEVER
SKIP TO QUESTIONS 9-10: 1
HOW OFTEN DO YOU HAVE A DRINK CONTAINING ALCOHOL: NEVER

## 2023-11-29 ASSESSMENT — PAIN SCALES - GENERAL
PAINLEVEL_OUTOF10: 3
PAINLEVEL_OUTOF10: 10 - WORST POSSIBLE PAIN
PAINLEVEL_OUTOF10: 3

## 2023-11-29 ASSESSMENT — ACTIVITIES OF DAILY LIVING (ADL): LACK_OF_TRANSPORTATION: NO

## 2023-11-29 ASSESSMENT — PATIENT HEALTH QUESTIONNAIRE - PHQ9
2. FEELING DOWN, DEPRESSED OR HOPELESS: NOT AT ALL
1. LITTLE INTEREST OR PLEASURE IN DOING THINGS: NOT AT ALL
SUM OF ALL RESPONSES TO PHQ9 QUESTIONS 1 & 2: 0

## 2023-11-29 NOTE — TELEPHONE ENCOUNTER
20.2 wk ob left message on ob line c/o cramping/contractions that seem to be getting worse.    LMTCB

## 2023-11-29 NOTE — ANESTHESIA PREPROCEDURE EVALUATION
Patient: Cris Walker    Evaluation Method: In-person visit    Procedure Information    Date: 23  Procedure: Labor Analgesia         Relevant Problems   Anesthesia (within normal limits)  Patient has never had GA in the past; denies family h/o problems with GA. H/o spinals in the past without issue.      Endocrine   (+) Hyperthyroidism       Clinical information reviewed:   Tobacco  Allergies  Meds  Problems  Med Hx  Surg Hx   Fam Hx  Soc   Hx        NPO Detail:  NPO/Void Status  Date of Last Solid: 23  Time of Last Solid: 0800  Last Intake Type: Food         OB/Gyn Evaluation    Present Pregnancy    Patient is pregnant now.  (+) ,  labor,  premature rupture of membranes   Obstetric History    (+)  section              Physical Exam    Airway  Mallampati: II  TM distance: >3 FB  Neck ROM: full     Cardiovascular - normal exam  Rhythm: regular  Rate: normal     Dental - normal exam     Pulmonary - normal exam  Breath sounds clear to auscultation     Abdominal - normal exam    Comments: gravid           Anesthesia Plan    ASA 2     epidural     The patient is not a current smoker.    Anesthetic plan and risks discussed with patient.  Use of blood products discussed with patient who consented to blood products.

## 2023-11-29 NOTE — CARE PLAN
The patient's goals for the shift include      The clinical goals for the shift include patient to accept loss    Problem: Pain  Goal: Takes deep breaths with improved pain control throughout the shift  Outcome: Progressing  Goal: Turns in bed with improved pain control throughout the shift  Outcome: Progressing  Goal: Walks with improved pain control throughout the shift  Outcome: Progressing  Goal: Performs ADL's with improved pain control throughout shift  Outcome: Progressing  Goal: Participates in PT with improved pain control throughout the shift  Outcome: Progressing  Goal: Free from opioid side effects throughout the shift  Outcome: Progressing  Goal: Free from acute confusion related to pain meds throughout the shift  Outcome: Progressing

## 2023-11-29 NOTE — H&P
Obstetrical Admission History and Physical     Cris Walker is a 38 y.o. . Presents at 20 weeks with severe abdominal cramping    Chief Complaint: Contractions and Leakage/Loss of Fluid (20.3 weeks)    Assessment/Plan     Labor / Incompetent Cervix - Imminent delivery of non-viable infant based on US dating less than 22 weeks     Principal Problem:     premature rupture of membranes (PPROM) with onset of labor after 24 hours of rupture in second trimester, antepartum      Pregnancy Problems (from 09/15/23 to present)       Problem Noted Resolved     premature rupture of membranes (PPROM) with onset of labor after 24 hours of rupture in second trimester, antepartum 2023 by Reyes Casanova MD No    Priority:  Medium      14 weeks gestation of pregnancy 10/18/2023 by Jaclyn Ayon MD No    Priority:  Medium      Supervision of other normal pregnancy, antepartum 10/17/2023 by Jaclyn Ayon MD No    Priority:  Medium      Overview Signed 10/17/2023  9:17 PM by Jaclyn Ayon MD     -s/p flu shot 9/15/23               Subjective   Cris is here complaining of q2 min contractions. Decreased fetal movement. Denies vaginal bleeding., Denies leaking of fluid.           Obstetrical History   OB History    Para Term  AB Living   6 5 4 1 0 5   SAB IAB Ectopic Multiple Live Births   0 0 0 0 5      # Outcome Date GA Lbr Escobar/2nd Weight Sex Delivery Anes PTL Lv   6 Current            5 Term 20 38w5d  2863 g M CS-Unspec EPI N JOSSUE   4 Term 18 39w0d  2353 g F CS-Unspec EPI  JOSSUE   3 Term 10/28/15 39w0d  2041 g F CS-Unspec EPI  JOSSUE   2 Term 12 39w0d  2807 g M CS-Unspec EPI  JOSSUE   1  11 36w0d  2495 g F CS-Unspec EPI  JOSSUE       Past Medical History  Past Medical History:   Diagnosis Date    Graves disease     Hashimoto's disease     History of uterine scar from previous surgery     History of  section        Past Surgical History   Past Surgical  History:   Procedure Laterality Date    BLADDER SURGERY       SECTION, CLASSIC  02/15/2017     Section    OVARY SURGERY Right     removal       Social History  Social History     Tobacco Use    Smoking status: Never     Passive exposure: Never    Smokeless tobacco: Never   Substance Use Topics    Alcohol use: Not Currently     Substance and Sexual Activity   Drug Use Never       Allergies  Patient has no known allergies.     Medications  Medications Prior to Admission   Medication Sig Dispense Refill Last Dose    prenatal no.139-iron-folic-dha 33 mg iron- 800 mcg-350 mg combo pack Take by mouth.          Objective    Last Vitals  Temp Pulse Resp BP MAP O2 Sat   36.9 °C (98.4 °F) 101 16 100/56   99 %     Physical Examination  GENERAL: Examination reveals a well developed, well nourished, gravid female  In visible discomfort . She is alert and cooperative.  LUNGS: clear to auscultation bilaterally  HEART: regular rate and rhythm, S1, S2 normal, no murmur, click, rub or gallop  ABDOMEN: soft, gravid, nontender, nondistended, no abnormal masses, no epigastric pain  VAGINA: normal appearing vagina with normal color and discharge and no lesions noted  CERVIX:  On speculum exam the fetal buttocks was seen ; MEMBRANES are apparently intact  NEUROLOGICAL: DTRs normal and symmetrical  PSYCHOLOGICAL: awake and alert; oriented to person, place, and time    Lab Review  Lab Results   Component Value Date    WBC 15.7 (H) 2023    HGB 12.5 2023    HCT 37.3 2023     2023

## 2023-11-30 NOTE — DISCHARGE INSTRUCTIONS
Call with any fever or chills. You may pass a small clot occasionally but call with any persistent heavy bleeding and clots (soaking more than 1 pad per hour). The possibility of retained placenta or uterine infection is higher in your situation. Call with any persistent abdominal pain that is more than normal cramping.

## 2023-11-30 NOTE — L&D DELIVERY NOTE
OB Delivery Note  2023  Cris Walker  38 y.o.   Vaginal, Spontaneous        Gestational Age: 20w2d  /Para:   Quantitative Blood Loss: Admission to Discharge: 945 mL (2023 10:39 AM - 2023  7:55 PM)    Christine Walker [[id number]]      Labor Events    Rupture date/time: 2023 1030  Rupture type: Spontaneous  Fluid color: Clear  Fluid odor: None  Labor type: Spontaneous Onset of Labor       Placenta    Placenta delivery date/time: 2023 1020  Placenta removal: Manual removal  Placenta appearance: Intact  Placenta disposition: pathology       Shushan Delivery    Birth date/time: 2023 10:58:00  Delivery type: Vaginal, Spontaneous       Resuscitation    Method: None       Apgars    Living status:  Demise  Apgar Component Scores:  1 min.:  5 min.:  10 min.:  15 min.:  20 min.:    Skin color:  0  0       Heart rate:  2  1       Reflex irritability:  0  0       Muscle tone:  0  0       Respiratory effort:  0  0       Total:  2  1       Apgars assigned by: MANINDER KIM       Delivery Providers    Delivering clinician: Reyes Casanova MD   Provider Role    Jeanie Pacheco RN Delivery Nurse    Margaret Kim RN Nursery Nurse     Resident                 Reyes Casanova MD    Patient presented with severe cramping. Thought her water broke about 15 minutes prior to arrival. She had been having some intermittent cramping over the last three days. On speculum exam the fetal buttocks was in the vagina. She delivered spontaneously about 15 minutes later with the bag intact. Fluid was clear. There was movement of the extremities. Tight nuchal cord x1 reduced. Cord was clamped and cut and placed with mother for comfort care. Patient had an ultrasound the week prior confirming her pre-viable dates.     The cord avulsed from the placenta but the placenta was in the vagina. It was grasped with a ring forceps and removed intact. No vaginal lacerations were noted.

## 2023-12-04 ENCOUNTER — POSTPARTUM VISIT (OUTPATIENT)
Dept: OBSTETRICS AND GYNECOLOGY | Facility: CLINIC | Age: 38
End: 2023-12-04
Payer: COMMERCIAL

## 2023-12-04 VITALS — DIASTOLIC BLOOD PRESSURE: 68 MMHG | SYSTOLIC BLOOD PRESSURE: 118 MMHG | BODY MASS INDEX: 18.95 KG/M2 | WEIGHT: 93.8 LBS

## 2023-12-04 LAB
LABORATORY COMMENT REPORT: NORMAL
PATH REPORT.FINAL DX SPEC: NORMAL
PATH REPORT.GROSS SPEC: NORMAL
PATH REPORT.RELEVANT HX SPEC: NORMAL
PATH REPORT.TOTAL CANCER: NORMAL

## 2023-12-04 PROCEDURE — 0503F POSTPARTUM CARE VISIT: CPT | Performed by: OBSTETRICS & GYNECOLOGY

## 2023-12-04 ASSESSMENT — EDINBURGH POSTNATAL DEPRESSION SCALE (EPDS)
I HAVE BEEN ANXIOUS OR WORRIED FOR NO GOOD REASON: HARDLY EVER
I HAVE FELT SAD OR MISERABLE: YES, QUITE OFTEN
TOTAL SCORE: 10
I HAVE BEEN SO UNHAPPY THAT I HAVE HAD DIFFICULTY SLEEPING: NOT VERY OFTEN
I HAVE BEEN ABLE TO LAUGH AND SEE THE FUNNY SIDE OF THINGS: NOT QUITE SO MUCH NOW
THINGS HAVE BEEN GETTING ON TOP OF ME: NO, MOST OF THE TIME I HAVE COPED QUITE WELL
I HAVE BEEN SO UNHAPPY THAT I HAVE BEEN CRYING: ONLY OCCASIONALLY
THE THOUGHT OF HARMING MYSELF HAS OCCURRED TO ME: NEVER
I HAVE LOOKED FORWARD WITH ENJOYMENT TO THINGS: RATHER LESS THAN I USED TO
I HAVE FELT SCARED OR PANICKY FOR NO GOOD REASON: NO, NOT AT ALL
I HAVE BLAMED MYSELF UNNECESSARILY WHEN THINGS WENT WRONG: YES, SOME OF THE TIME

## 2023-12-06 NOTE — PROGRESS NOTES
Postpartum Visit    HPI:  Pt presents for close follow up after 20 wk labor and fetal demise.  Started jonnie painfully at home most of the night, came into L&D in AM and the fetus was in the vagina and she delivered en caul quickly after arrival.   She is tearful but coping.  Has good support at home.  Story sounds more like labor than cervical insufficiency (had a normal cervical length on her anatomy scan 9 days prior).  Currently having bleeding like a light period.      ROS:  Denies the following: Complains of the following:    - episiotomy site pain   - incisional pain  - incisional drainage  - heavy bleeding  - irregular bleeding  - breast pain  - cracked nipples  - breastfeeding problems  - abdominal pain  - vaginal discharge  - shortness of breath  - chest pain  - back pain  - leg pain  - depression  - suicidal ideation  - nausea  - vomiting  - fever  - pain with urination  - tiredness or fatigue  - headache no pertinent positives        O:  /68   Wt (!) 42.5 kg (93 lb 12.8 oz)   LMP 07/10/2023   BMI 18.95 kg/m²     General Appearance   - consistent with stated age, well groomed and cooperative    Integumentary  - skin warm and dry without rash    Head and Neck  - normalocephalic and neck supple    A/P:   Pt is a 38 y.o.  who presents for follow up visit after likely PTL at 20 wks and resultant delivery and fetal demise.  Overall coping well.  Her EPDS is 10.  Has good support at home.  Discussed birth control, MFM consult if she would like.  She will think about these things and contact our office when she knows how she would like to proceed.    Jaclyn Ayon MD

## 2023-12-20 ENCOUNTER — TELEPHONE (OUTPATIENT)
Dept: OBSTETRICS AND GYNECOLOGY | Facility: CLINIC | Age: 38
End: 2023-12-20
Payer: COMMERCIAL

## 2023-12-20 NOTE — TELEPHONE ENCOUNTER
Pt called regarding paperwork to sent over to verify miscarriage     Location: [facility]  Fax #: [phone]

## 2023-12-22 ENCOUNTER — APPOINTMENT (OUTPATIENT)
Dept: OBSTETRICS AND GYNECOLOGY | Facility: CLINIC | Age: 38
End: 2023-12-22
Payer: COMMERCIAL

## 2023-12-22 ENCOUNTER — OFFICE VISIT (OUTPATIENT)
Dept: OBSTETRICS AND GYNECOLOGY | Facility: CLINIC | Age: 38
End: 2023-12-22
Payer: COMMERCIAL

## 2023-12-22 VITALS
HEIGHT: 58 IN | WEIGHT: 90.38 LBS | DIASTOLIC BLOOD PRESSURE: 64 MMHG | SYSTOLIC BLOOD PRESSURE: 110 MMHG | BODY MASS INDEX: 18.97 KG/M2

## 2023-12-22 DIAGNOSIS — N93.9 VAGINAL BLEEDING: Primary | ICD-10-CM

## 2023-12-22 DIAGNOSIS — O42.112: ICD-10-CM

## 2023-12-22 PROBLEM — Z34.80 SUPERVISION OF OTHER NORMAL PREGNANCY, ANTEPARTUM (HHS-HCC): Status: RESOLVED | Noted: 2023-10-17 | Resolved: 2023-12-22

## 2023-12-22 PROBLEM — Z3A.14 14 WEEKS GESTATION OF PREGNANCY (HHS-HCC): Status: RESOLVED | Noted: 2023-10-18 | Resolved: 2023-12-22

## 2023-12-22 PROCEDURE — 1036F TOBACCO NON-USER: CPT | Performed by: OBSTETRICS & GYNECOLOGY

## 2023-12-22 PROCEDURE — 99213 OFFICE O/P EST LOW 20 MIN: CPT | Performed by: OBSTETRICS & GYNECOLOGY

## 2023-12-22 NOTE — PROGRESS NOTES
Subjective   Patient ID: Cris Walker is a 38 y.o. female who presents for follow up after 20 wk loss.     HPI  37 y/o  presenting for follow up after recent 20 wks loss on .  Still having daily bleeding.  Placental pathology with possibly incomplete placenta.    Review of Systems   Genitourinary:  Positive for vaginal bleeding.     Objective   Physical Exam  Gen: in NAD  Pelvic: cervix closed, small amount of red blood in vault    Assessment/Plan   37 y/o  presenting for follow up after 20 wk loss (likely spontaneous labor) on  with continued bleeding. Will check formal MFM scan for rPOC and further plan based on results.    MD Jaclyn Barnes MD 23 8:36 AM

## 2023-12-28 ENCOUNTER — ANCILLARY PROCEDURE (OUTPATIENT)
Dept: RADIOLOGY | Facility: CLINIC | Age: 38
End: 2023-12-28
Payer: COMMERCIAL

## 2023-12-28 ENCOUNTER — APPOINTMENT (OUTPATIENT)
Dept: OBSTETRICS AND GYNECOLOGY | Facility: CLINIC | Age: 38
End: 2023-12-28
Payer: COMMERCIAL

## 2023-12-28 DIAGNOSIS — O42.112: ICD-10-CM

## 2023-12-28 PROCEDURE — 76815 OB US LIMITED FETUS(S): CPT | Performed by: OBSTETRICS & GYNECOLOGY

## 2023-12-28 PROCEDURE — 76815 OB US LIMITED FETUS(S): CPT

## 2024-01-02 ENCOUNTER — TELEPHONE (OUTPATIENT)
Dept: OBSTETRICS AND GYNECOLOGY | Facility: CLINIC | Age: 39
End: 2024-01-02
Payer: COMMERCIAL

## 2024-01-02 ENCOUNTER — PREP FOR PROCEDURE (OUTPATIENT)
Dept: OBSTETRICS AND GYNECOLOGY | Facility: CLINIC | Age: 39
End: 2024-01-02
Payer: COMMERCIAL

## 2024-01-02 DIAGNOSIS — O03.4 RETAINED PRODUCTS OF CONCEPTION AFTER MISCARRIAGE (HHS-HCC): Primary | ICD-10-CM

## 2024-01-02 RX ORDER — ACETAMINOPHEN 325 MG/1
975 TABLET ORAL ONCE
Status: CANCELLED | OUTPATIENT
Start: 2024-01-02 | End: 2024-01-02

## 2024-01-02 RX ORDER — CELECOXIB 50 MG/1
400 CAPSULE ORAL ONCE
Status: CANCELLED | OUTPATIENT
Start: 2024-01-02 | End: 2024-01-02

## 2024-01-02 RX ORDER — GABAPENTIN 600 MG/1
600 TABLET ORAL ONCE
Status: CANCELLED | OUTPATIENT
Start: 2024-01-02 | End: 2024-01-02

## 2024-01-05 ENCOUNTER — ANESTHESIA (OUTPATIENT)
Dept: OPERATING ROOM | Facility: HOSPITAL | Age: 39
End: 2024-01-05
Payer: COMMERCIAL

## 2024-01-05 ENCOUNTER — HOSPITAL ENCOUNTER (OUTPATIENT)
Facility: HOSPITAL | Age: 39
Setting detail: OUTPATIENT SURGERY
Discharge: HOME | End: 2024-01-05
Attending: OBSTETRICS & GYNECOLOGY | Admitting: OBSTETRICS & GYNECOLOGY
Payer: COMMERCIAL

## 2024-01-05 ENCOUNTER — ANESTHESIA EVENT (OUTPATIENT)
Dept: OPERATING ROOM | Facility: HOSPITAL | Age: 39
End: 2024-01-05
Payer: COMMERCIAL

## 2024-01-05 ENCOUNTER — HOSPITAL ENCOUNTER (INPATIENT)
Facility: HOSPITAL | Age: 39
End: 2024-01-05
Attending: OBSTETRICS & GYNECOLOGY | Admitting: OBSTETRICS & GYNECOLOGY
Payer: COMMERCIAL

## 2024-01-05 VITALS
HEART RATE: 75 BPM | OXYGEN SATURATION: 98 % | RESPIRATION RATE: 18 BRPM | HEIGHT: 58 IN | WEIGHT: 91 LBS | BODY MASS INDEX: 19.1 KG/M2 | TEMPERATURE: 97.3 F | DIASTOLIC BLOOD PRESSURE: 73 MMHG | SYSTOLIC BLOOD PRESSURE: 154 MMHG

## 2024-01-05 DIAGNOSIS — O03.4 RETAINED PRODUCTS OF CONCEPTION AFTER MISCARRIAGE (HHS-HCC): Primary | ICD-10-CM

## 2024-01-05 DIAGNOSIS — R11.0 POSTOPERATIVE NAUSEA: ICD-10-CM

## 2024-01-05 DIAGNOSIS — Z98.890 POSTOPERATIVE NAUSEA: ICD-10-CM

## 2024-01-05 DIAGNOSIS — R52 POSTPARTUM PAIN (HHS-HCC): ICD-10-CM

## 2024-01-05 DIAGNOSIS — O21.9 NAUSEA AND VOMITING IN PREGNANCY PRIOR TO 22 WEEKS GESTATION (HHS-HCC): Primary | ICD-10-CM

## 2024-01-05 LAB
ABO GROUP (TYPE) IN BLOOD: NORMAL
ANTIBODY SCREEN: NORMAL
ERYTHROCYTE [DISTWIDTH] IN BLOOD BY AUTOMATED COUNT: 12.2 % (ref 11.5–14.5)
ERYTHROCYTE [DISTWIDTH] IN BLOOD BY AUTOMATED COUNT: 12.5 % (ref 11.5–14.5)
HCT VFR BLD AUTO: 36.7 % (ref 36–46)
HCT VFR BLD AUTO: 37.5 % (ref 36–46)
HGB BLD-MCNC: 11.8 G/DL (ref 12–16)
HGB BLD-MCNC: 12 G/DL (ref 12–16)
MCH RBC QN AUTO: 29.1 PG (ref 26–34)
MCH RBC QN AUTO: 29.6 PG (ref 26–34)
MCHC RBC AUTO-ENTMCNC: 32 G/DL (ref 32–36)
MCHC RBC AUTO-ENTMCNC: 32.2 G/DL (ref 32–36)
MCV RBC AUTO: 91 FL (ref 80–100)
MCV RBC AUTO: 92 FL (ref 80–100)
NRBC BLD-RTO: 0 /100 WBCS (ref 0–0)
NRBC BLD-RTO: 0 /100 WBCS (ref 0–0)
PLATELET # BLD AUTO: 174 X10*3/UL (ref 150–450)
PLATELET # BLD AUTO: 178 X10*3/UL (ref 150–450)
RBC # BLD AUTO: 3.99 X10*6/UL (ref 4–5.2)
RBC # BLD AUTO: 4.12 X10*6/UL (ref 4–5.2)
RH FACTOR (ANTIGEN D): NORMAL
WBC # BLD AUTO: 3.9 X10*3/UL (ref 4.4–11.3)
WBC # BLD AUTO: 5.6 X10*3/UL (ref 4.4–11.3)

## 2024-01-05 PROCEDURE — 3600000007 HC OR TIME - EACH INCREMENTAL 1 MINUTE - PROCEDURE LEVEL TWO: Performed by: OBSTETRICS & GYNECOLOGY

## 2024-01-05 PROCEDURE — 86901 BLOOD TYPING SEROLOGIC RH(D): CPT | Performed by: OBSTETRICS & GYNECOLOGY

## 2024-01-05 PROCEDURE — 86920 COMPATIBILITY TEST SPIN: CPT

## 2024-01-05 PROCEDURE — 3700000001 HC GENERAL ANESTHESIA TIME - INITIAL BASE CHARGE: Performed by: OBSTETRICS & GYNECOLOGY

## 2024-01-05 PROCEDURE — 36415 COLL VENOUS BLD VENIPUNCTURE: CPT | Performed by: OBSTETRICS & GYNECOLOGY

## 2024-01-05 PROCEDURE — 85027 COMPLETE CBC AUTOMATED: CPT | Performed by: OBSTETRICS & GYNECOLOGY

## 2024-01-05 PROCEDURE — 3600000003 HC OR TIME - INITIAL BASE CHARGE - PROCEDURE LEVEL THREE: Performed by: OBSTETRICS & GYNECOLOGY

## 2024-01-05 PROCEDURE — 3700000002 HC GENERAL ANESTHESIA TIME - EACH INCREMENTAL 1 MINUTE: Performed by: OBSTETRICS & GYNECOLOGY

## 2024-01-05 PROCEDURE — 7100000010 HC PHASE TWO TIME - EACH INCREMENTAL 1 MINUTE: Performed by: OBSTETRICS & GYNECOLOGY

## 2024-01-05 PROCEDURE — 2500000005 HC RX 250 GENERAL PHARMACY W/O HCPCS: Performed by: NURSE ANESTHETIST, CERTIFIED REGISTERED

## 2024-01-05 PROCEDURE — 58558 HYSTEROSCOPY BIOPSY: CPT | Performed by: OBSTETRICS & GYNECOLOGY

## 2024-01-05 PROCEDURE — 3600000002 HC OR TIME - INITIAL BASE CHARGE - PROCEDURE LEVEL TWO: Performed by: OBSTETRICS & GYNECOLOGY

## 2024-01-05 PROCEDURE — 2500000001 HC RX 250 WO HCPCS SELF ADMINISTERED DRUGS (ALT 637 FOR MEDICARE OP): Performed by: OBSTETRICS & GYNECOLOGY

## 2024-01-05 PROCEDURE — 88305 TISSUE EXAM BY PATHOLOGIST: CPT | Mod: TC,SUR,STJLAB | Performed by: OBSTETRICS & GYNECOLOGY

## 2024-01-05 PROCEDURE — 2500000004 HC RX 250 GENERAL PHARMACY W/ HCPCS (ALT 636 FOR OP/ED): Performed by: NURSE ANESTHETIST, CERTIFIED REGISTERED

## 2024-01-05 PROCEDURE — A58558 PR HYSTEROSCOPY,W/ENDO BX: Performed by: NURSE ANESTHETIST, CERTIFIED REGISTERED

## 2024-01-05 PROCEDURE — 2500000004 HC RX 250 GENERAL PHARMACY W/ HCPCS (ALT 636 FOR OP/ED): Performed by: OBSTETRICS & GYNECOLOGY

## 2024-01-05 PROCEDURE — 3600000008 HC OR TIME - EACH INCREMENTAL 1 MINUTE - PROCEDURE LEVEL THREE: Performed by: OBSTETRICS & GYNECOLOGY

## 2024-01-05 PROCEDURE — 2720000007 HC OR 272 NO HCPCS: Performed by: OBSTETRICS & GYNECOLOGY

## 2024-01-05 PROCEDURE — A58558 PR HYSTEROSCOPY,W/ENDO BX: Performed by: ANESTHESIOLOGIST ASSISTANT

## 2024-01-05 PROCEDURE — 7100000001 HC RECOVERY ROOM TIME - INITIAL BASE CHARGE: Performed by: OBSTETRICS & GYNECOLOGY

## 2024-01-05 PROCEDURE — 88305 TISSUE EXAM BY PATHOLOGIST: CPT | Performed by: PATHOLOGY

## 2024-01-05 PROCEDURE — 7100000009 HC PHASE TWO TIME - INITIAL BASE CHARGE: Performed by: OBSTETRICS & GYNECOLOGY

## 2024-01-05 PROCEDURE — 2500000005 HC RX 250 GENERAL PHARMACY W/O HCPCS: Performed by: ANESTHESIOLOGY

## 2024-01-05 PROCEDURE — 7100000002 HC RECOVERY ROOM TIME - EACH INCREMENTAL 1 MINUTE: Performed by: OBSTETRICS & GYNECOLOGY

## 2024-01-05 PROCEDURE — 2500000004 HC RX 250 GENERAL PHARMACY W/ HCPCS (ALT 636 FOR OP/ED): Performed by: ANESTHESIOLOGIST ASSISTANT

## 2024-01-05 PROCEDURE — A58558 PR HYSTEROSCOPY,W/ENDO BX: Performed by: ANESTHESIOLOGY

## 2024-01-05 PROCEDURE — 2500000005 HC RX 250 GENERAL PHARMACY W/O HCPCS: Performed by: ANESTHESIOLOGIST ASSISTANT

## 2024-01-05 RX ORDER — HYDRALAZINE HYDROCHLORIDE 20 MG/ML
5 INJECTION INTRAMUSCULAR; INTRAVENOUS EVERY 30 MIN PRN
Status: DISCONTINUED | OUTPATIENT
Start: 2024-01-05 | End: 2024-01-05 | Stop reason: HOSPADM

## 2024-01-05 RX ORDER — HYDROMORPHONE HYDROCHLORIDE 1 MG/ML
0.5 INJECTION, SOLUTION INTRAMUSCULAR; INTRAVENOUS; SUBCUTANEOUS EVERY 5 MIN PRN
Status: DISCONTINUED | OUTPATIENT
Start: 2024-01-05 | End: 2024-01-05 | Stop reason: HOSPADM

## 2024-01-05 RX ORDER — ALBUTEROL SULFATE 0.83 MG/ML
2.5 SOLUTION RESPIRATORY (INHALATION) ONCE AS NEEDED
Status: DISCONTINUED | OUTPATIENT
Start: 2024-01-05 | End: 2024-01-05 | Stop reason: HOSPADM

## 2024-01-05 RX ORDER — OXYTOCIN/0.9 % SODIUM CHLORIDE 30/500 ML
60 PLASTIC BAG, INJECTION (ML) INTRAVENOUS CONTINUOUS
Status: DISCONTINUED | OUTPATIENT
Start: 2024-01-05 | End: 2024-01-05 | Stop reason: HOSPADM

## 2024-01-05 RX ORDER — MIDAZOLAM HYDROCHLORIDE 1 MG/ML
1 INJECTION, SOLUTION INTRAMUSCULAR; INTRAVENOUS ONCE AS NEEDED
Status: DISCONTINUED | OUTPATIENT
Start: 2024-01-05 | End: 2024-01-05 | Stop reason: HOSPADM

## 2024-01-05 RX ORDER — ACETAMINOPHEN 325 MG/1
975 TABLET ORAL ONCE
Status: DISCONTINUED | OUTPATIENT
Start: 2024-01-05 | End: 2024-01-05 | Stop reason: HOSPADM

## 2024-01-05 RX ORDER — DEXAMETHASONE SODIUM PHOSPHATE 4 MG/ML
INJECTION, SOLUTION INTRA-ARTICULAR; INTRALESIONAL; INTRAMUSCULAR; INTRAVENOUS; SOFT TISSUE AS NEEDED
Status: DISCONTINUED | OUTPATIENT
Start: 2024-01-05 | End: 2024-01-05

## 2024-01-05 RX ORDER — IBUPROFEN 200 MG
600 TABLET ORAL EVERY 6 HOURS PRN
COMMUNITY
Start: 2024-01-05 | End: 2024-03-05 | Stop reason: SINTOL

## 2024-01-05 RX ORDER — OXYCODONE HYDROCHLORIDE 10 MG/1
10 TABLET ORAL EVERY 4 HOURS PRN
Status: DISCONTINUED | OUTPATIENT
Start: 2024-01-05 | End: 2024-01-05 | Stop reason: HOSPADM

## 2024-01-05 RX ORDER — NORETHINDRONE AND ETHINYL ESTRADIOL 0.5-0.035
KIT ORAL AS NEEDED
Status: DISCONTINUED | OUTPATIENT
Start: 2024-01-05 | End: 2024-01-05

## 2024-01-05 RX ORDER — MIDAZOLAM HYDROCHLORIDE 1 MG/ML
INJECTION, SOLUTION INTRAMUSCULAR; INTRAVENOUS AS NEEDED
Status: DISCONTINUED | OUTPATIENT
Start: 2024-01-05 | End: 2024-01-05

## 2024-01-05 RX ORDER — LIDOCAINE HYDROCHLORIDE 10 MG/ML
0.1 INJECTION, SOLUTION EPIDURAL; INFILTRATION; INTRACAUDAL; PERINEURAL ONCE
Status: DISCONTINUED | OUTPATIENT
Start: 2024-01-05 | End: 2024-01-05 | Stop reason: HOSPADM

## 2024-01-05 RX ORDER — PHENAZOPYRIDINE HYDROCHLORIDE 100 MG/1
200 TABLET, FILM COATED ORAL ONCE AS NEEDED
Status: DISCONTINUED | OUTPATIENT
Start: 2024-01-05 | End: 2024-01-05 | Stop reason: HOSPADM

## 2024-01-05 RX ORDER — ACETAMINOPHEN 325 MG/1
975 TABLET ORAL ONCE
Status: COMPLETED | OUTPATIENT
Start: 2024-01-05 | End: 2024-01-05

## 2024-01-05 RX ORDER — HYDROMORPHONE HYDROCHLORIDE 1 MG/ML
0.2 INJECTION, SOLUTION INTRAMUSCULAR; INTRAVENOUS; SUBCUTANEOUS EVERY 5 MIN PRN
Status: DISCONTINUED | OUTPATIENT
Start: 2024-01-05 | End: 2024-01-05 | Stop reason: HOSPADM

## 2024-01-05 RX ORDER — PROPOFOL 10 MG/ML
INJECTION, EMULSION INTRAVENOUS AS NEEDED
Status: DISCONTINUED | OUTPATIENT
Start: 2024-01-05 | End: 2024-01-05

## 2024-01-05 RX ORDER — OXYCODONE HYDROCHLORIDE 5 MG/1
5 TABLET ORAL EVERY 4 HOURS PRN
Status: DISCONTINUED | OUTPATIENT
Start: 2024-01-05 | End: 2024-01-05 | Stop reason: HOSPADM

## 2024-01-05 RX ORDER — LABETALOL HYDROCHLORIDE 5 MG/ML
5 INJECTION, SOLUTION INTRAVENOUS ONCE AS NEEDED
Status: DISCONTINUED | OUTPATIENT
Start: 2024-01-05 | End: 2024-01-05 | Stop reason: HOSPADM

## 2024-01-05 RX ORDER — MISOPROSTOL 200 UG/1
800 TABLET ORAL ONCE
Status: COMPLETED | OUTPATIENT
Start: 2024-01-05 | End: 2024-01-05

## 2024-01-05 RX ORDER — GLYCOPYRROLATE 0.2 MG/ML
INJECTION INTRAMUSCULAR; INTRAVENOUS AS NEEDED
Status: DISCONTINUED | OUTPATIENT
Start: 2024-01-05 | End: 2024-01-05

## 2024-01-05 RX ORDER — SODIUM CHLORIDE, SODIUM LACTATE, POTASSIUM CHLORIDE, CALCIUM CHLORIDE 600; 310; 30; 20 MG/100ML; MG/100ML; MG/100ML; MG/100ML
100 INJECTION, SOLUTION INTRAVENOUS CONTINUOUS
Status: DISCONTINUED | OUTPATIENT
Start: 2024-01-05 | End: 2024-01-05 | Stop reason: HOSPADM

## 2024-01-05 RX ORDER — ONDANSETRON 4 MG/1
TABLET, FILM COATED ORAL
Qty: 14 TABLET | Refills: 0 | Status: SHIPPED | OUTPATIENT
Start: 2024-01-05 | End: 2024-03-05 | Stop reason: ALTCHOICE

## 2024-01-05 RX ORDER — KETOROLAC TROMETHAMINE 30 MG/ML
INJECTION, SOLUTION INTRAMUSCULAR; INTRAVENOUS AS NEEDED
Status: DISCONTINUED | OUTPATIENT
Start: 2024-01-05 | End: 2024-01-05

## 2024-01-05 RX ORDER — METHYLERGONOVINE MALEATE 0.2 MG/ML
0.2 INJECTION INTRAVENOUS ONCE
Status: COMPLETED | OUTPATIENT
Start: 2024-01-05 | End: 2024-01-05

## 2024-01-05 RX ORDER — DIPHENHYDRAMINE HYDROCHLORIDE 50 MG/ML
12.5 INJECTION INTRAMUSCULAR; INTRAVENOUS ONCE AS NEEDED
Status: DISCONTINUED | OUTPATIENT
Start: 2024-01-05 | End: 2024-01-05 | Stop reason: HOSPADM

## 2024-01-05 RX ORDER — LIDOCAINE HYDROCHLORIDE 20 MG/ML
INJECTION, SOLUTION INFILTRATION; PERINEURAL AS NEEDED
Status: DISCONTINUED | OUTPATIENT
Start: 2024-01-05 | End: 2024-01-05

## 2024-01-05 RX ORDER — FENTANYL CITRATE 50 UG/ML
INJECTION, SOLUTION INTRAMUSCULAR; INTRAVENOUS AS NEEDED
Status: DISCONTINUED | OUTPATIENT
Start: 2024-01-05 | End: 2024-01-05

## 2024-01-05 RX ORDER — PHENYLEPHRINE HCL IN 0.9% NACL 1 MG/10 ML
SYRINGE (ML) INTRAVENOUS AS NEEDED
Status: DISCONTINUED | OUTPATIENT
Start: 2024-01-05 | End: 2024-01-05

## 2024-01-05 RX ORDER — ONDANSETRON HYDROCHLORIDE 2 MG/ML
4 INJECTION, SOLUTION INTRAVENOUS ONCE AS NEEDED
Status: DISCONTINUED | OUTPATIENT
Start: 2024-01-05 | End: 2024-01-05 | Stop reason: HOSPADM

## 2024-01-05 RX ORDER — ONDANSETRON HYDROCHLORIDE 2 MG/ML
INJECTION, SOLUTION INTRAVENOUS AS NEEDED
Status: DISCONTINUED | OUTPATIENT
Start: 2024-01-05 | End: 2024-01-05

## 2024-01-05 RX ORDER — CELECOXIB 200 MG/1
400 CAPSULE ORAL ONCE
Status: COMPLETED | OUTPATIENT
Start: 2024-01-05 | End: 2024-01-05

## 2024-01-05 RX ORDER — PROPOFOL 10 MG/ML
INJECTION, EMULSION INTRAVENOUS CONTINUOUS PRN
Status: DISCONTINUED | OUTPATIENT
Start: 2024-01-05 | End: 2024-01-05

## 2024-01-05 RX ORDER — OXYCODONE AND ACETAMINOPHEN 5; 325 MG/1; MG/1
1 TABLET ORAL EVERY 4 HOURS PRN
Status: DISCONTINUED | OUTPATIENT
Start: 2024-01-05 | End: 2024-01-05 | Stop reason: HOSPADM

## 2024-01-05 RX ORDER — TRANEXAMIC ACID 10 MG/ML
1000 INJECTION, SOLUTION INTRAVENOUS ONCE
Status: COMPLETED | OUTPATIENT
Start: 2024-01-05 | End: 2024-01-05

## 2024-01-05 RX ORDER — HYDROCODONE BITARTRATE AND ACETAMINOPHEN 5; 325 MG/1; MG/1
1 TABLET ORAL EVERY 4 HOURS PRN
Status: DISCONTINUED | OUTPATIENT
Start: 2024-01-05 | End: 2024-01-05 | Stop reason: HOSPADM

## 2024-01-05 RX ORDER — ACETAMINOPHEN 500 MG
1000 TABLET ORAL EVERY 6 HOURS PRN
COMMUNITY
Start: 2024-01-05 | End: 2024-03-05 | Stop reason: ALTCHOICE

## 2024-01-05 RX ORDER — ACETAMINOPHEN 325 MG/1
650 TABLET ORAL EVERY 4 HOURS PRN
Status: DISCONTINUED | OUTPATIENT
Start: 2024-01-05 | End: 2024-01-05 | Stop reason: HOSPADM

## 2024-01-05 RX ORDER — HYDROMORPHONE HYDROCHLORIDE 1 MG/ML
0.1 INJECTION, SOLUTION INTRAMUSCULAR; INTRAVENOUS; SUBCUTANEOUS EVERY 5 MIN PRN
Status: DISCONTINUED | OUTPATIENT
Start: 2024-01-05 | End: 2024-01-05 | Stop reason: HOSPADM

## 2024-01-05 RX ORDER — GABAPENTIN 600 MG/1
600 TABLET ORAL ONCE
Status: COMPLETED | OUTPATIENT
Start: 2024-01-05 | End: 2024-01-05

## 2024-01-05 RX ORDER — LIDOCAINE HYDROCHLORIDE 20 MG/ML
INJECTION, SOLUTION EPIDURAL; INFILTRATION; INTRACAUDAL; PERINEURAL AS NEEDED
Status: DISCONTINUED | OUTPATIENT
Start: 2024-01-05 | End: 2024-01-05

## 2024-01-05 RX ADMIN — Medication 6 L/MIN: at 12:20

## 2024-01-05 RX ADMIN — OXYTOCIN-SODIUM CHLORIDE 0.9% IV SOLN 30 UNIT/500ML 60 MILLI-UNITS/MIN: 30-0.9/5 SOLUTION at 13:50

## 2024-01-05 RX ADMIN — EPHEDRINE SULFATE 5 MG: 50 INJECTION, SOLUTION INTRAVENOUS at 11:28

## 2024-01-05 RX ADMIN — METHYLERGONOVINE MALEATE 0.2 MG: 0.2 INJECTION, SOLUTION INTRAMUSCULAR; INTRAVENOUS at 13:57

## 2024-01-05 RX ADMIN — EPHEDRINE SULFATE 5 MG: 50 INJECTION, SOLUTION INTRAVENOUS at 11:58

## 2024-01-05 RX ADMIN — PROPOFOL 150 MG: 10 INJECTION, EMULSION INTRAVENOUS at 11:16

## 2024-01-05 RX ADMIN — ONDANSETRON 4 MG: 2 INJECTION INTRAMUSCULAR; INTRAVENOUS at 11:24

## 2024-01-05 RX ADMIN — CELECOXIB 400 MG: 200 CAPSULE ORAL at 08:13

## 2024-01-05 RX ADMIN — ONDANSETRON 4 MG: 2 INJECTION, SOLUTION INTRAMUSCULAR; INTRAVENOUS at 14:22

## 2024-01-05 RX ADMIN — LIDOCAINE HYDROCHLORIDE 50 MG: 20 INJECTION, SOLUTION EPIDURAL; INFILTRATION; INTRACAUDAL; PERINEURAL at 11:16

## 2024-01-05 RX ADMIN — SODIUM CHLORIDE, SODIUM LACTATE, POTASSIUM CHLORIDE, AND CALCIUM CHLORIDE: 600; 310; 30; 20 INJECTION, SOLUTION INTRAVENOUS at 12:02

## 2024-01-05 RX ADMIN — SODIUM CHLORIDE, SODIUM LACTATE, POTASSIUM CHLORIDE, AND CALCIUM CHLORIDE: 600; 310; 30; 20 INJECTION, SOLUTION INTRAVENOUS at 11:12

## 2024-01-05 RX ADMIN — Medication 100 MCG: at 11:23

## 2024-01-05 RX ADMIN — PROPOFOL 100 MG: 10 INJECTION, EMULSION INTRAVENOUS at 14:12

## 2024-01-05 RX ADMIN — GLYCOPYRROLATE 0.2 MG: 0.2 INJECTION, SOLUTION INTRAMUSCULAR; INTRAVENOUS at 11:25

## 2024-01-05 RX ADMIN — GABAPENTIN 600 MG: 600 TABLET, FILM COATED ORAL at 08:14

## 2024-01-05 RX ADMIN — ACETAMINOPHEN 975 MG: 325 TABLET ORAL at 08:13

## 2024-01-05 RX ADMIN — FENTANYL CITRATE 50 MCG: 50 INJECTION, SOLUTION INTRAMUSCULAR; INTRAVENOUS at 11:16

## 2024-01-05 RX ADMIN — SODIUM CHLORIDE, POTASSIUM CHLORIDE, SODIUM LACTATE AND CALCIUM CHLORIDE: 600; 310; 30; 20 INJECTION, SOLUTION INTRAVENOUS at 14:08

## 2024-01-05 RX ADMIN — KETOROLAC TROMETHAMINE 15 MG: 30 INJECTION, SOLUTION INTRAMUSCULAR; INTRAVENOUS at 12:01

## 2024-01-05 RX ADMIN — DEXAMETHASONE SODIUM PHOSPHATE 4 MG: 4 INJECTION, SOLUTION INTRAMUSCULAR; INTRAVENOUS at 11:24

## 2024-01-05 RX ADMIN — PROPOFOL 50 MCG/KG/MIN: 10 INJECTION, EMULSION INTRAVENOUS at 11:27

## 2024-01-05 RX ADMIN — FENTANYL CITRATE 50 MCG: 50 INJECTION, SOLUTION INTRAMUSCULAR; INTRAVENOUS at 14:12

## 2024-01-05 RX ADMIN — MIDAZOLAM 2 MG: 1 INJECTION INTRAMUSCULAR; INTRAVENOUS at 14:06

## 2024-01-05 RX ADMIN — EPHEDRINE SULFATE 5 MG: 50 INJECTION, SOLUTION INTRAVENOUS at 14:28

## 2024-01-05 RX ADMIN — LIDOCAINE HYDROCHLORIDE 100 MG: 20 INJECTION, SOLUTION INFILTRATION; PERINEURAL at 14:12

## 2024-01-05 RX ADMIN — EPHEDRINE SULFATE 5 MG: 50 INJECTION, SOLUTION INTRAVENOUS at 14:33

## 2024-01-05 RX ADMIN — TRANEXAMIC ACID 1000 MG: 10 INJECTION, SOLUTION INTRAVENOUS at 14:26

## 2024-01-05 RX ADMIN — MIDAZOLAM 2 MG: 1 INJECTION INTRAMUSCULAR; INTRAVENOUS at 11:10

## 2024-01-05 SDOH — HEALTH STABILITY: MENTAL HEALTH: CURRENT SMOKER: 0

## 2024-01-05 ASSESSMENT — PAIN SCALES - GENERAL
PAIN_LEVEL: 2
PAINLEVEL_OUTOF10: 0 - NO PAIN
PAIN_LEVEL: 0
PAINLEVEL_OUTOF10: 0 - NO PAIN

## 2024-01-05 ASSESSMENT — PAIN - FUNCTIONAL ASSESSMENT

## 2024-01-05 ASSESSMENT — COLUMBIA-SUICIDE SEVERITY RATING SCALE - C-SSRS
2. HAVE YOU ACTUALLY HAD ANY THOUGHTS OF KILLING YOURSELF?: NO
6. HAVE YOU EVER DONE ANYTHING, STARTED TO DO ANYTHING, OR PREPARED TO DO ANYTHING TO END YOUR LIFE?: NO
1. IN THE PAST MONTH, HAVE YOU WISHED YOU WERE DEAD OR WISHED YOU COULD GO TO SLEEP AND NOT WAKE UP?: NO

## 2024-01-05 NOTE — OP NOTE
Indications for procedure:   Pt is a 38 y.o. yo female who presented with retained products of conception after recent 20 wk loss.  Counseled for hysteroscopy, removal of retained products of conception and consent signed.     Description of procedure:  The patient was taken to the operating room and placed in supine position on the operating room table.  A stop-check was performed confirming the patient and the procedure to be completed.  General anesthesia was administered. She was then placed in the dorsolithotomy position with her legs in Kale stirrups.  SCDs were placed and turned on.  The abdomen, perineum, and vagina were prepped sterilely.  The patient was then draped in standard sterile fashion.  A bivalve speculum was placed in the patient's vagina and the anterior lip of the cervix was grasped with a single tooth tenaculum.      The cervix was carefully and progressively dilated.  The hysteroscope was then inserted with visualization of the uterine body, fundus and tubal ostia.  Retained placental tissue was noted along the posterior wall of the uterus and was resected using the Aveta device. The hysteroscope was then removed.  Minimal bleeding was noted from the uterus. A gentle sharp curettage was done.  The tenaculum was removed from the cervix.  The tenaculum sites were noted to be hemostatic.  All instruments were then removed from the vagina.      The patient was cleaned and dried and reversed from anesthesia.  She was returned to supine position.  The patient was taken to the PACU in stable and satisfactory condition.  All sponge, needle, and instrument counts were correct at the end of the procedure.    Jaclyn Ayon MD

## 2024-01-05 NOTE — ANESTHESIA PROCEDURE NOTES
Airway  Date/Time: 1/5/2024 2:16 PM  Urgency: elective    Airway not difficult    Staffing  Performed: CAA and ANUPAMA   Authorized by: Trey Dennis MD    Performed by: KINJAL Greenfield  Patient location during procedure: OR    Indications and Patient Condition  Indications for airway management: anesthesia  Spontaneous Ventilation: absent  Sedation level: deep  Preoxygenated: yes  Patient position: sniffing  MILS maintained throughout  Mask difficulty assessment: 1 - vent by mask  Planned trial extubation    Final Airway Details  Final airway type: supraglottic airway      Successful airway: air-Q  Size 3     Number of attempts at approach: 1  Ventilation between attempts: supraglottic airway

## 2024-01-05 NOTE — OP NOTE
Preoperative diagnosis: postop bleeding after hysteroscopic removal of retained products of conception    Postoperative diagnosis: Same     Operative procedure: Dilation and suction curettage     EBL - <5    Indications for procedure:  38 y.o.  who underwent a hysteroscopic resection of retained products of conception approximately 2 hours ago.  On her first time up to the bathroom, she passed multiple clots and soaked two maxi pads.  Bedside transabdominal US revealed clot in uterus and patient was counseled for a takeback to the OR for dilation and curettage, possible uterine balloon placement, possible hysterectomy.  Consent signed and risks discussed.   She received a pitocin bolus and metherine 0.2 mg IM x1 preoperatively.     Description of procedure:  The patient was taken to the operating room and placed on the operating room table.  General anesthesia was administered and the patient was placed in dorsal lithotomy position with her legs in Kale stirrups.  SCDs were placed and turned on.  Her vulva, vagina and perineum were prepped and draped in usual sterile fashion.  A stop check was then performed confirming the proper patient and procedure to be performed.      A bivalve speculum was then placed in the patient’s vagina and the anterior lip of the cervix was grasped with a single toothed tenaculum.  An 7 mm rigid suction curette was then inserted.  Under ultrasound guidance, suction was applied and over two passes clot was removed from the uterus..   Good grit was then felt throughout and there was minimal bleeding noted from the uterus and cervix.  Her endometrial stripe was thin by ultrasound at this point.  800 mcg of cytotec were placed rectally.      The tenaculum was removed from the cervix.  Minimal bleeding was again noted.  Her bleeding was watched for a prolonged amount of time in the OR and remained minimal.  All instruments were then removed from the patient’s vagina.  The patient was  then cleaned and dried and placed back in dorsal position.  Anesthesia was reversed and she was taken to PACU in stable and satisfactory condition.  All sponge, needle, and instrument counts were correct at the end of the procedure.   The patient tolerated the procedure well.    Jaclyn Ayon MD

## 2024-01-05 NOTE — ANESTHESIA POSTPROCEDURE EVALUATION
Patient: Cris Walker    Procedure Summary       Date: 01/05/24 Room / Location: [facility] OR 03 / [facility] OR    Anesthesia Start: 1110 Anesthesia Stop:     Procedure: Hysteroscopy (Vagina ) Diagnosis:       Retained products of conception after miscarriage      (Retained products of conception after miscarriage [O03.4])    Surgeons: Jaclyn Ayon MD Responsible Provider: Dakota Naranjo MD    Anesthesia Type: general ASA Status: 1            Anesthesia Type: general    Vitals Value Taken Time   /69 01/05/24 1219   Temp 36 01/05/24 1219   Pulse 91 01/05/24 1219   Resp 12 01/05/24 1219   SpO2 100 01/05/24 1219       Anesthesia Post Evaluation    Patient location during evaluation: PACU  Patient participation: complete - patient participated  Level of consciousness: awake and alert  Pain score: 2  Pain management: adequate  Airway patency: patent  Cardiovascular status: acceptable  Respiratory status: acceptable  Hydration status: acceptable  Postoperative Nausea and Vomiting: none        There were no known notable events for this encounter.

## 2024-01-05 NOTE — ANESTHESIA PREPROCEDURE EVALUATION
Patient: Cris Walker    Procedure Information       Anesthesia Start Date/Time: 01/05/24 1408    Procedure: Dilatation and Curettage - BRING BACK TO OPERATING ROOM    Location: [facility] OR 03 / Virtual [facility] OR    Surgeons: Yoselyn ORTA MD            Relevant Problems   Endocrine   (+) Hyperthyroidism       Clinical information reviewed:   Tobacco  Allergies  Meds  Problems  Med Hx  Surg Hx  OB Status    Fam Hx  Soc Hx        NPO Detail:  NPO/Void Status  Date of Last Liquid: 01/04/24  Time of Last Liquid: 0816  Date of Last Solid: 01/04/24  Time of Last Solid: 1930  Last Intake Type: Clear fluids  Time of Last Void: 0730         Physical Exam    Airway  Mallampati: I  TM distance: >3 FB     Cardiovascular   Rhythm: regular  Rate: normal     Dental - normal exam     Pulmonary   Breath sounds clear to auscultation     Abdominal            Anesthesia Plan    ASA 1     general     The patient is not a current smoker.    intravenous induction   Postoperative administration of opioids is intended.  Anesthetic plan and risks discussed with patient.    Plan discussed with CAA.

## 2024-01-05 NOTE — PROGRESS NOTES
Postop op event note    Patient with increased bleeding in phase 2 of PACU after getting up to bathroom for first time.  Asssesed by Dr. Guerra, uterus full of clot.  Pitocin hung.  Type and cross for 2U ordered.  Plan to return to OR for dilation and curettage, possible uterine balloon placement, possible hysterectomy. Discussed we would only proceed with hysterectomy as a life saving procedure and that we will do everything possible to preserve her uterus.  IR also contacted for possible [place] if needed and IR is available.  All questions answered and consent signed.     Jaclyn Ayon MD

## 2024-01-05 NOTE — ANESTHESIA PREPROCEDURE EVALUATION
Patient: Cris Walker    Procedure Information       Date/Time: 01/05/24 0945    Procedure: Hysteroscopy - hysteroscopic removal of retained products of conception with aveta, also have D&C machine available    Location: [facility] OR  / [facility] OR    Surgeons: Jaclyn Ayon MD            Relevant Problems   Endocrine   (+) Hyperthyroidism       Clinical information reviewed:   Tobacco  Allergies  Meds   Med Hx  Surg Hx  OB Status  Fam Hx  Soc   Hx        NPO Detail:  NPO/Void Status  Date of Last Liquid: 01/04/24  Time of Last Liquid: 0816  Date of Last Solid: 01/04/24  Time of Last Solid: 1930  Last Intake Type: Clear fluids  Time of Last Void: 0730         Physical Exam    Airway  Mallampati: I  TM distance: >3 FB  Neck ROM: full     Cardiovascular - normal exam  Rhythm: regular  Rate: normal     Dental - normal exam     Pulmonary - normal exam  Breath sounds clear to auscultation     Abdominal   Abdomen: soft  Bowel sounds: normal           Anesthesia Plan    ASA 1     general     The patient is not a current smoker.  Patient was previously instructed to abstain from smoking on day of procedure.  Patient did not smoke on day of procedure.    intravenous induction   Postoperative administration of opioids is intended.    Plan discussed with CRNA.

## 2024-01-05 NOTE — ANESTHESIA PROCEDURE NOTES
Airway  Date/Time: 1/5/2024 11:18 AM  Urgency: elective      Staffing  Performed: [facility]   Authorized by: Dakota Naranjo MD    Performed by: TAWANNA Rai-CLEMENT  Patient location during procedure: OR    Indications and Patient Condition  Indications for airway management: anesthesia  Spontaneous Ventilation: absent  Sedation level: deep  Preoxygenated: yes  Patient position: sniffing  Mask difficulty assessment: 0 - not attempted    Final Airway Details  Final airway type: supraglottic airway      Successful airway: Supraglottic airway: igel.  Size 3     Number of attempts at approach: 1

## 2024-01-05 NOTE — ANESTHESIA POSTPROCEDURE EVALUATION
Patient: Cris Walker    Procedure Summary       Date: 01/05/24 Room / Location: [facility] OR 03 / Virtual [facility] OR    Anesthesia Start: 1408 Anesthesia Stop:     Procedure: Dilatation and Curettage (Vagina ) Diagnosis:     Surgeons: Yoselyn ORTA MD Responsible Provider: Trey Dennis MD    Anesthesia Type: regional ASA Status: 1            Anesthesia Type: regional    Vitals Value Taken Time   /73 01/05/24 1450   Temp 36 01/05/24 1456   Pulse 66 01/05/24 1453   Resp 16 01/05/24 1453   SpO2 100 % 01/05/24 1453   Vitals shown include unvalidated device data.    Anesthesia Post Evaluation    Patient location during evaluation: PACU  Patient participation: complete - patient participated  Level of consciousness: awake and alert and responsive to light touch  Pain score: 0  Pain management: adequate  Airway patency: patent  Cardiovascular status: acceptable  Respiratory status: acceptable  Hydration status: acceptable  Postoperative Nausea and Vomiting: none        There were no known notable events for this encounter.

## 2024-01-05 NOTE — H&P
"History Of Present Illness  Cris Walker is a 38 y.o. female  with recent 20 wk  after likely  labor with retained products of conception.  Here for hysteroscopy, D&C for removal of retained products of conception.     Past Medical History  Past Medical History:   Diagnosis Date    Graves disease     Hashimoto's disease     History of uterine scar from previous surgery     History of  section    Hypothyroidism        Surgical History  Past Surgical History:   Procedure Laterality Date    BLADDER SURGERY       SECTION, CLASSIC  02/15/2017     Section    OVARY SURGERY Right     removal        Social History  She reports that she has never smoked. She has never been exposed to tobacco smoke. She has never used smokeless tobacco. She reports that she does not currently use alcohol. She reports that she does not use drugs.    Family History  Family History   Problem Relation Name Age of Onset    Diabetes Father          Allergies  Patient has no known allergies.    Review of Systems   Genitourinary:  Positive for vaginal bleeding.   All other systems reviewed and are negative.       Physical Exam  Constitutional:       Appearance: Normal appearance.   Abdominal:      General: Abdomen is flat.      Palpations: Abdomen is soft.   Neurological:      Mental Status: She is alert.          Last Recorded Vitals  Blood pressure 117/74, pulse 75, temperature 36.2 °C (97.2 °F), temperature source Temporal, resp. rate 18, height 1.473 m (4' 10\"), weight (!) 41.3 kg (91 lb), SpO2 100 %, unknown if currently breastfeeding.    Relevant Results  CBC        Component Value Flag Ref Range Units Status    WBC 3.9      4.4 - 11.3 x10*3/uL Final    nRBC 0.0      0.0 - 0.0 /100 WBCs Final    RBC 4.12      4.00 - 5.20 x10*6/uL Final    Hemoglobin 12.0      12.0 - 16.0 g/dL Final    Hematocrit 37.5      36.0 - 46.0 % Final    MCV 91      80 - 100 fL Final    MCH 29.1      26.0 - 34.0 pg Final    MCHC " 32.0      32.0 - 36.0 g/dL Final    RDW 12.5      11.5 - 14.5 % Final    Platelets 178      150 - 450 x10*3/uL Final                  Type and Screen        Component    ABO TYPE    O      Rh TYPE    POS      ANTIBODY SCREEN    NEG                  =     Assessment/Plan   Principal Problem:    Retained products of conception after miscarriage    39 y/o  s/p 20 wk  after likely  labor with prolonged bleeding after delivery and US consistent with retained products of conception.  Counseled for hysteroscopy, D&C, removal of retained products of conception.  Risks including bleeding, infection, uterine perforation discussed and consent signed. No periop antibiotics needed.      Jaclyn Ayon MD      Addendum:  Patient with increased bleeding in phase 2 of PACU after getting up to bathroom for first time.  Asssesed by  Dr. Guerra, uterus with clot.  Pitocin hung.  Type and cross for 2U ordered.  Plan to return to OR for dilation and curettage, possible uterine balloon placement, possible hysterectomy.  Discussed we would only proceed with hysterectomy as a life saving procedure and that we will do everything possible to preserve her uterus.  IR also contacted for possible [place] if needed and IR is available.      Jaclyn Ayon MD

## 2024-01-09 LAB
BLOOD EXPIRATION DATE: NORMAL
DISPENSE STATUS: NORMAL
LABORATORY COMMENT REPORT: NORMAL
LABORATORY COMMENT REPORT: NORMAL
PATH REPORT.FINAL DX SPEC: NORMAL
PATH REPORT.FINAL DX SPEC: NORMAL
PATH REPORT.GROSS SPEC: NORMAL
PATH REPORT.GROSS SPEC: NORMAL
PATH REPORT.RELEVANT HX SPEC: NORMAL
PATH REPORT.RELEVANT HX SPEC: NORMAL
PATH REPORT.TOTAL CANCER: NORMAL
PATH REPORT.TOTAL CANCER: NORMAL
PRODUCT BLOOD TYPE: 5100
PRODUCT CODE: NORMAL
UNIT ABO: NORMAL
UNIT NUMBER: NORMAL
UNIT RH: NORMAL
UNIT VOLUME: 350
XM INTEP: NORMAL

## 2024-01-19 ENCOUNTER — OFFICE VISIT (OUTPATIENT)
Dept: OBSTETRICS AND GYNECOLOGY | Facility: CLINIC | Age: 39
End: 2024-01-19
Payer: COMMERCIAL

## 2024-01-19 VITALS
SYSTOLIC BLOOD PRESSURE: 92 MMHG | DIASTOLIC BLOOD PRESSURE: 52 MMHG | BODY MASS INDEX: 18.14 KG/M2 | HEIGHT: 59 IN | WEIGHT: 90 LBS

## 2024-01-19 DIAGNOSIS — Z09 POSTOP CHECK: ICD-10-CM

## 2024-01-19 PROCEDURE — 1036F TOBACCO NON-USER: CPT | Performed by: OBSTETRICS & GYNECOLOGY

## 2024-01-19 PROCEDURE — 99024 POSTOP FOLLOW-UP VISIT: CPT | Performed by: OBSTETRICS & GYNECOLOGY

## 2024-01-19 NOTE — PROGRESS NOTES
Patient here post op  D&C  Hysteroscopy    Subjective   Patient ID: Cris Walker is a 38 y.o. female who presents for Post-op.     HPI  37 y/o  presenting for postop visit after hysteroscopy, removal of rPOC c/b takeback to OR for suction D&C due to increased bleeding in PACU.  Overall doing well.  Pathology c/w rPOC.  Having light spotting currently.  Unsure how she feels about another pregnancy (does want one but knows it may not be best for it), does not want birth control at this time.     Review of Systems   Genitourinary:  Positive for vaginal bleeding.   All other systems reviewed and are negative.    Objective   Physical Exam  Gen in NAD    Assessment/Plan   37 y/o  presenting for 2 wk postop visit after hysteroscopy, removal of rPOC c/b takeback to OR for increased bleeding postop.  Overall doing well.  Pathology c/w rPOC.  MFM referral placed to discuss future pregnancies in setting of this recent 20 wk loss.  Declines birth control for now.  RTC for annual exam or any other concerns.    Jaclyn Ayon MD 24 10:13 AM

## 2024-01-22 ENCOUNTER — DOCUMENTATION (OUTPATIENT)
Dept: CASE MANAGEMENT | Facility: HOSPITAL | Age: 39
End: 2024-01-22
Payer: COMMERCIAL

## 2024-01-22 NOTE — PROGRESS NOTES
"Cris Walker is a 39yo  who delivered Baby Girl \"Diana\" at 20w following severe abdominal cramping and was found to have PPROM (see L&D note for more details). Cris has recently come back for a D&C for retained POC (see 24 H&P for more details). Parent Bereavement Intern, Didier Patel, called Cris to provide bereavement support follow-up. Her phone went to . Didier left a  with direct contact information for the Parent Bereavement Program.     PLAN: Will continue to be available for bereavement support and services as needed.      24 at 12:30 PM - Didier Patel    This note has been reviewed and approved by Didier Patel's supervisor:  Ivanna Arenas, MSSA, LISW, CGP  Director of Parent Bereavement Programs    Please call [phone] or secure chat Ivanna Arenas with any questions or concerns.    "

## 2024-03-05 ENCOUNTER — OFFICE VISIT (OUTPATIENT)
Dept: OBSTETRICS AND GYNECOLOGY | Facility: CLINIC | Age: 39
End: 2024-03-05
Payer: COMMERCIAL

## 2024-03-05 VITALS
DIASTOLIC BLOOD PRESSURE: 72 MMHG | BODY MASS INDEX: 17.79 KG/M2 | SYSTOLIC BLOOD PRESSURE: 102 MMHG | WEIGHT: 88.25 LBS | HEIGHT: 59 IN

## 2024-03-05 DIAGNOSIS — Z87.51 HISTORY OF PRETERM DELIVERY: Primary | ICD-10-CM

## 2024-03-05 PROCEDURE — 99213 OFFICE O/P EST LOW 20 MIN: CPT | Performed by: OBSTETRICS & GYNECOLOGY

## 2024-03-05 PROCEDURE — 1036F TOBACCO NON-USER: CPT | Performed by: OBSTETRICS & GYNECOLOGY

## 2024-03-05 RX ORDER — CEFDINIR 300 MG/1
300 CAPSULE ORAL 2 TIMES DAILY
COMMUNITY
Start: 2024-03-02

## 2024-03-05 RX ORDER — NEOMYCIN SULFATE, POLYMYXIN B SULFATE, AND DEXAMETHASONE 3.5; 10000; 1 MG/G; [USP'U]/G; MG/G
OINTMENT OPHTHALMIC
COMMUNITY
Start: 2024-03-02

## 2024-03-05 ASSESSMENT — ENCOUNTER SYMPTOMS
EYES NEGATIVE: 1
CONSTITUTIONAL NEGATIVE: 1
CARDIOVASCULAR NEGATIVE: 1
RESPIRATORY NEGATIVE: 1
ENDOCRINE NEGATIVE: 1

## 2024-03-13 DIAGNOSIS — G43.019 INTRACTABLE MIGRAINE WITHOUT AURA AND WITHOUT STATUS MIGRAINOSUS: ICD-10-CM

## 2024-03-13 RX ORDER — SUMATRIPTAN 50 MG/1
50 TABLET, FILM COATED ORAL ONCE AS NEEDED
COMMUNITY
End: 2024-03-13 | Stop reason: SDUPTHER

## 2024-03-13 NOTE — TELEPHONE ENCOUNTER
Pt is calling in regards to her Sumatriptan 50 MG. Pt states that she tried to refill her medication but the pharmacy stated that they don't have a script on file for her. In Epic the medication was discontinued on 11-22-23 by Jenna Swanson with the reason as therapy completed. Pt is asking if you can resend this in?    REFILL  MEDICATION:     Sumatriptan 50 MG; Take 1 tablet 1 time if needed for migraine. May repeat after 2 hours.    [facility]ARM: [facility]   PHARM NUMBER: [phone]     LR: 5-30-23        27 tablets with 3 refills   LV: 5-30-23  NV: No future appt.

## 2024-03-14 RX ORDER — SUMATRIPTAN 50 MG/1
50 TABLET, FILM COATED ORAL ONCE AS NEEDED
Qty: 9 TABLET | Refills: 0 | Status: SHIPPED | OUTPATIENT
Start: 2024-03-14

## 2024-03-14 NOTE — TELEPHONE ENCOUNTER
Please schedule her follow up or physical as she was last seen 5/30/2023. Medication has been refilled.

## 2024-06-20 PROBLEM — O21.9 NAUSEA AND VOMITING IN PREGNANCY (HHS-HCC): Status: RESOLVED | Noted: 2023-07-14 | Resolved: 2024-06-20

## 2024-06-21 ENCOUNTER — LAB (OUTPATIENT)
Dept: LAB | Facility: LAB | Age: 39
End: 2024-06-21
Payer: COMMERCIAL

## 2024-06-21 ENCOUNTER — OFFICE VISIT (OUTPATIENT)
Dept: PRIMARY CARE | Facility: CLINIC | Age: 39
End: 2024-06-21
Payer: COMMERCIAL

## 2024-06-21 VITALS
WEIGHT: 89 LBS | DIASTOLIC BLOOD PRESSURE: 66 MMHG | TEMPERATURE: 96.4 F | SYSTOLIC BLOOD PRESSURE: 106 MMHG | BODY MASS INDEX: 18.28 KG/M2

## 2024-06-21 DIAGNOSIS — G43.019 INTRACTABLE MIGRAINE WITHOUT AURA AND WITHOUT STATUS MIGRAINOSUS: ICD-10-CM

## 2024-06-21 DIAGNOSIS — G43.901 STATUS MIGRAINOSUS: Primary | ICD-10-CM

## 2024-06-21 LAB
ANION GAP SERPL CALC-SCNC: 11 MMOL/L (ref 10–20)
BASOPHILS # BLD AUTO: 0.03 X10*3/UL (ref 0–0.1)
BASOPHILS NFR BLD AUTO: 0.7 %
BUN SERPL-MCNC: 15 MG/DL (ref 6–23)
CALCIUM SERPL-MCNC: 9.2 MG/DL (ref 8.6–10.3)
CHLORIDE SERPL-SCNC: 107 MMOL/L (ref 98–107)
CO2 SERPL-SCNC: 25 MMOL/L (ref 21–32)
CREAT SERPL-MCNC: 0.4 MG/DL (ref 0.5–1.05)
EGFRCR SERPLBLD CKD-EPI 2021: >90 ML/MIN/1.73M*2
EOSINOPHIL # BLD AUTO: 0.06 X10*3/UL (ref 0–0.7)
EOSINOPHIL NFR BLD AUTO: 1.4 %
ERYTHROCYTE [DISTWIDTH] IN BLOOD BY AUTOMATED COUNT: 12.8 % (ref 11.5–14.5)
GLUCOSE SERPL-MCNC: 83 MG/DL (ref 74–99)
HCT VFR BLD AUTO: 40.1 % (ref 36–46)
HGB BLD-MCNC: 12.8 G/DL (ref 12–16)
IMM GRANULOCYTES # BLD AUTO: 0 X10*3/UL (ref 0–0.7)
IMM GRANULOCYTES NFR BLD AUTO: 0 % (ref 0–0.9)
LYMPHOCYTES # BLD AUTO: 0.92 X10*3/UL (ref 1.2–4.8)
LYMPHOCYTES NFR BLD AUTO: 21.8 %
MCH RBC QN AUTO: 29.5 PG (ref 26–34)
MCHC RBC AUTO-ENTMCNC: 31.9 G/DL (ref 32–36)
MCV RBC AUTO: 92 FL (ref 80–100)
MONOCYTES # BLD AUTO: 0.36 X10*3/UL (ref 0.1–1)
MONOCYTES NFR BLD AUTO: 8.5 %
NEUTROPHILS # BLD AUTO: 2.85 X10*3/UL (ref 1.2–7.7)
NEUTROPHILS NFR BLD AUTO: 67.6 %
NRBC BLD-RTO: 0 /100 WBCS (ref 0–0)
PLATELET # BLD AUTO: 175 X10*3/UL (ref 150–450)
POTASSIUM SERPL-SCNC: 4 MMOL/L (ref 3.5–5.3)
RBC # BLD AUTO: 4.34 X10*6/UL (ref 4–5.2)
SODIUM SERPL-SCNC: 139 MMOL/L (ref 136–145)
TSH SERPL-ACNC: 1.49 MIU/L (ref 0.44–3.98)
WBC # BLD AUTO: 4.2 X10*3/UL (ref 4.4–11.3)

## 2024-06-21 PROCEDURE — 1036F TOBACCO NON-USER: CPT | Performed by: FAMILY MEDICINE

## 2024-06-21 PROCEDURE — 84443 ASSAY THYROID STIM HORMONE: CPT

## 2024-06-21 PROCEDURE — 96372 THER/PROPH/DIAG INJ SC/IM: CPT | Performed by: FAMILY MEDICINE

## 2024-06-21 PROCEDURE — 99214 OFFICE O/P EST MOD 30 MIN: CPT | Performed by: FAMILY MEDICINE

## 2024-06-21 PROCEDURE — 80048 BASIC METABOLIC PNL TOTAL CA: CPT

## 2024-06-21 PROCEDURE — 36415 COLL VENOUS BLD VENIPUNCTURE: CPT

## 2024-06-21 PROCEDURE — 85025 COMPLETE CBC W/AUTO DIFF WBC: CPT

## 2024-06-21 RX ORDER — SUMATRIPTAN 50 MG/1
50 TABLET, FILM COATED ORAL ONCE AS NEEDED
Qty: 9 TABLET | Refills: 3 | Status: SHIPPED | OUTPATIENT
Start: 2024-06-21

## 2024-06-21 RX ORDER — KETOROLAC TROMETHAMINE 30 MG/ML
60 INJECTION, SOLUTION INTRAMUSCULAR; INTRAVENOUS ONCE
Status: COMPLETED | OUTPATIENT
Start: 2024-06-21 | End: 2024-06-21

## 2024-06-21 ASSESSMENT — PATIENT HEALTH QUESTIONNAIRE - PHQ9
SUM OF ALL RESPONSES TO PHQ9 QUESTIONS 1 AND 2: 0
1. LITTLE INTEREST OR PLEASURE IN DOING THINGS: NOT AT ALL
2. FEELING DOWN, DEPRESSED OR HOPELESS: NOT AT ALL

## 2024-06-21 NOTE — PROGRESS NOTES
Chief complaint:   Chief Complaint   Patient presents with    Migraine     Everyday for 7 days    Med Refill     Sumatriptan        HPI:  Cris Walker is a 38 y.o. female who presents for evaluation of headaches. They are more frequent and lasting longer. She does not know her current triggers. It used to betriggered by her menses and would resolve once they started.     Migraines started around age 19. They were occurring once monthly. Over the past 3 mo has had 2 per week on average. Sumatriptan has worked for these and works within 30 minute.  She did not use the Sumatriptan this time as she only had 1 left and was saving it. She tried Nurtec given by a friend.     She currently has a migraine and this is the 7th day. She has tried taking Advil and heating pads. She did not atake any thing this am. She took at a Sumatriptan yesterday without benefit.    Physical exam:  /66   Temp 35.8 °C (96.4 °F)   Wt (!) 40.4 kg (89 lb)   BMI 18.28 kg/m²   General: NAD, well appearing female  Eyes: white sclera, PERRLA, EOM intact  Nose: moist  Mouth: moist  Neck: no cervical lymphadenopathy  Heart: RRR, no mumur appreciated  Lungs: CTAB, no wheezes, rales, rhonchi  Abdomen: soft, non tender, normoactive BS, no organomegaly  Extremities: No LE edema  Neuro: +2/4 biceps and patellar reflexes, sensation grossly intact, muscle strength +5/5 upper and lower extremities grossly intact.     Assessment/Plan   Problem List Items Addressed This Visit    None  Visit Diagnoses       Status migrainosus    -  Primary    Relevant Medications    ketorolac (Toradol) injection 60 mg (Completed)    Intractable migraine without aura and without status migrainosus        Relevant Medications    SUMAtriptan (Imitrex) 50 mg tablet    Other Relevant Orders    MR brain wo IV contrast    CBC and Auto Differential (Completed)    Tsh With Reflex To Free T4 If Abnormal (Completed)    Basic metabolic panel (Completed)        Toradol 60 mg IM  given once in office today  Refilled Sumatriptan  Labs ordered  MRI brain ordered for increased frequency of migraines over the past 3 mo  Follow up 2 days if not improving, otherwise 2 mo    Juliana Rangel, DO

## 2024-06-22 DIAGNOSIS — R79.89 ABNORMAL CBC: Primary | ICD-10-CM

## 2024-07-02 ENCOUNTER — HOSPITAL ENCOUNTER (OUTPATIENT)
Dept: RADIOLOGY | Facility: HOSPITAL | Age: 39
Discharge: HOME | End: 2024-07-02
Payer: COMMERCIAL

## 2024-07-02 DIAGNOSIS — G43.019 INTRACTABLE MIGRAINE WITHOUT AURA AND WITHOUT STATUS MIGRAINOSUS: ICD-10-CM

## 2024-07-02 PROCEDURE — 70551 MRI BRAIN STEM W/O DYE: CPT

## 2024-07-12 ENCOUNTER — LAB (OUTPATIENT)
Dept: LAB | Facility: LAB | Age: 39
End: 2024-07-12
Payer: COMMERCIAL

## 2024-07-12 DIAGNOSIS — R79.89 ABNORMAL CBC: ICD-10-CM

## 2024-07-12 LAB
BASOPHILS # BLD AUTO: 0.03 X10*3/UL (ref 0–0.1)
BASOPHILS NFR BLD AUTO: 0.8 %
EOSINOPHIL # BLD AUTO: 0.07 X10*3/UL (ref 0–0.7)
EOSINOPHIL NFR BLD AUTO: 1.8 %
ERYTHROCYTE [DISTWIDTH] IN BLOOD BY AUTOMATED COUNT: 12.7 % (ref 11.5–14.5)
HCT VFR BLD AUTO: 38.9 % (ref 36–46)
HGB BLD-MCNC: 12.7 G/DL (ref 12–16)
IMM GRANULOCYTES # BLD AUTO: 0.01 X10*3/UL (ref 0–0.7)
IMM GRANULOCYTES NFR BLD AUTO: 0.3 % (ref 0–0.9)
LYMPHOCYTES # BLD AUTO: 1.27 X10*3/UL (ref 1.2–4.8)
LYMPHOCYTES NFR BLD AUTO: 32.8 %
MCH RBC QN AUTO: 29.5 PG (ref 26–34)
MCHC RBC AUTO-ENTMCNC: 32.6 G/DL (ref 32–36)
MCV RBC AUTO: 90 FL (ref 80–100)
MONOCYTES # BLD AUTO: 0.36 X10*3/UL (ref 0.1–1)
MONOCYTES NFR BLD AUTO: 9.3 %
NEUTROPHILS # BLD AUTO: 2.13 X10*3/UL (ref 1.2–7.7)
NEUTROPHILS NFR BLD AUTO: 55 %
NRBC BLD-RTO: 0 /100 WBCS (ref 0–0)
PLATELET # BLD AUTO: 182 X10*3/UL (ref 150–450)
RBC # BLD AUTO: 4.31 X10*6/UL (ref 4–5.2)
WBC # BLD AUTO: 3.9 X10*3/UL (ref 4.4–11.3)

## 2024-07-12 PROCEDURE — 85025 COMPLETE CBC W/AUTO DIFF WBC: CPT | Performed by: FAMILY MEDICINE

## 2024-08-30 ENCOUNTER — APPOINTMENT (OUTPATIENT)
Dept: PRIMARY CARE | Facility: CLINIC | Age: 39
End: 2024-08-30
Payer: COMMERCIAL

## 2024-08-30 VITALS
DIASTOLIC BLOOD PRESSURE: 60 MMHG | BODY MASS INDEX: 18.08 KG/M2 | TEMPERATURE: 96.5 F | SYSTOLIC BLOOD PRESSURE: 110 MMHG | WEIGHT: 88 LBS

## 2024-08-30 DIAGNOSIS — N92.0 MENORRHAGIA WITH REGULAR CYCLE: Primary | ICD-10-CM

## 2024-08-30 DIAGNOSIS — G43.019 INTRACTABLE MIGRAINE WITHOUT AURA AND WITHOUT STATUS MIGRAINOSUS: ICD-10-CM

## 2024-08-30 PROCEDURE — 99213 OFFICE O/P EST LOW 20 MIN: CPT | Performed by: FAMILY MEDICINE

## 2024-08-30 PROCEDURE — 1036F TOBACCO NON-USER: CPT | Performed by: FAMILY MEDICINE

## 2024-08-30 RX ORDER — SUMATRIPTAN 50 MG/1
50 TABLET, FILM COATED ORAL ONCE AS NEEDED
Qty: 9 TABLET | Refills: 3 | Status: SHIPPED | OUTPATIENT
Start: 2024-08-30

## 2024-08-30 ASSESSMENT — PATIENT HEALTH QUESTIONNAIRE - PHQ9
2. FEELING DOWN, DEPRESSED OR HOPELESS: NOT AT ALL
1. LITTLE INTEREST OR PLEASURE IN DOING THINGS: NOT AT ALL
SUM OF ALL RESPONSES TO PHQ9 QUESTIONS 1 AND 2: 0

## 2024-08-30 NOTE — PROGRESS NOTES
Chief complaint:   Chief Complaint   Patient presents with    2 month follow up       HPI:  Cris Walker is a 38 y.o. female who presents for evaluation of headaches. She uses Sumatriptan 4 dayes monthly. It works within 10 minutes or so. The headaches start prior to period then linger a couple days later.     Her periods are getting worse. She has a lot of clotting. She is filling a heavy pad every few minutes due to clotting at some points with 2-3 days of this. She follows with OBGYN: Dr. Ayon. She has not tolerated birth control in the past. This is not a new problem for her.     No dizziness, SOB, lightheadedness    Physical exam:  /60   Temp 35.8 °C (96.5 °F)   Wt (!) 39.9 kg (88 lb)   BMI 18.08 kg/m²   General: NAD, well appearing female  Heart: RRR, no mumur appreciated  Lungs: CTAB, no wheezes, rales, rhonchi  Abdomen: soft, non tender, normoactive BS, no organomegaly  Extremities: No LE edema    Assessment/Plan   Problem List Items Addressed This Visit    None  Visit Diagnoses       Menorrhagia with regular cycle    -  Primary    Intractable migraine without aura and without status migrainosus        Relevant Medications    SUMAtriptan (Imitrex) 50 mg tablet        Last CBC d 7/12/2024  Advised to follow up with Dr. Ayon regarding options for her heavy bleeding  Options for migraine treatment were discussed, Sumatriptan has been refilled as it is working and she will follow up if it fails to help or if headache change in character, intensity, or frequency    Juliana Rangel, DO

## 2024-12-03 ENCOUNTER — TELEPHONE (OUTPATIENT)
Dept: PRIMARY CARE | Facility: CLINIC | Age: 39
End: 2024-12-03
Payer: COMMERCIAL

## 2024-12-03 DIAGNOSIS — G43.019 INTRACTABLE MIGRAINE WITHOUT AURA AND WITHOUT STATUS MIGRAINOSUS: ICD-10-CM

## 2024-12-03 RX ORDER — SUMATRIPTAN 50 MG/1
50 TABLET, FILM COATED ORAL ONCE AS NEEDED
Qty: 9 TABLET | Refills: 3 | Status: SHIPPED | OUTPATIENT
Start: 2024-12-03

## 2024-12-03 NOTE — TELEPHONE ENCOUNTER
REFILL  MEDICATION:     Sumatriptan 50 MG; Take 1 tablet 1 time if needed for migraine. May repeat dose once in 2 hours if no relief. Do not exceed 2 doses in 24 hours.     PHARM: [facility]   PHARM NUMBER: [phone]    LR: 8/30/24      9 tablets with 3 refills   LV: 8/30/24  NV: 5/02/25

## 2024-12-11 ENCOUNTER — APPOINTMENT (OUTPATIENT)
Dept: HEMATOLOGY/ONCOLOGY | Facility: CLINIC | Age: 39
End: 2024-12-11
Payer: COMMERCIAL

## 2024-12-20 ENCOUNTER — LAB (OUTPATIENT)
Dept: LAB | Facility: CLINIC | Age: 39
End: 2024-12-20
Payer: COMMERCIAL

## 2024-12-20 ENCOUNTER — OFFICE VISIT (OUTPATIENT)
Dept: HEMATOLOGY/ONCOLOGY | Facility: CLINIC | Age: 39
End: 2024-12-20
Payer: COMMERCIAL

## 2024-12-20 VITALS
OXYGEN SATURATION: 100 % | SYSTOLIC BLOOD PRESSURE: 111 MMHG | WEIGHT: 88.18 LBS | HEART RATE: 81 BPM | RESPIRATION RATE: 16 BRPM | DIASTOLIC BLOOD PRESSURE: 62 MMHG | TEMPERATURE: 97.3 F | BODY MASS INDEX: 18.12 KG/M2

## 2024-12-20 DIAGNOSIS — N92.1 MENOMETRORRHAGIA: Primary | ICD-10-CM

## 2024-12-20 DIAGNOSIS — N92.1 MENOMETRORRHAGIA: ICD-10-CM

## 2024-12-20 DIAGNOSIS — G43.809 OTHER MIGRAINE WITHOUT STATUS MIGRAINOSUS, NOT INTRACTABLE: ICD-10-CM

## 2024-12-20 LAB
BASOPHILS # BLD AUTO: 0.02 X10*3/UL (ref 0–0.1)
BASOPHILS NFR BLD AUTO: 0.6 %
EOSINOPHIL # BLD AUTO: 0.05 X10*3/UL (ref 0–0.7)
EOSINOPHIL NFR BLD AUTO: 1.4 %
ERYTHROCYTE [DISTWIDTH] IN BLOOD BY AUTOMATED COUNT: 13 % (ref 11.5–14.5)
HCT VFR BLD AUTO: 38.9 % (ref 36–46)
HGB BLD-MCNC: 12.6 G/DL (ref 12–16)
IMM GRANULOCYTES # BLD AUTO: 0.01 X10*3/UL (ref 0–0.7)
IMM GRANULOCYTES NFR BLD AUTO: 0.3 % (ref 0–0.9)
LYMPHOCYTES # BLD AUTO: 0.82 X10*3/UL (ref 1.2–4.8)
LYMPHOCYTES NFR BLD AUTO: 22.7 %
MCH RBC QN AUTO: 29.3 PG (ref 26–34)
MCHC RBC AUTO-ENTMCNC: 32.4 G/DL (ref 32–36)
MCV RBC AUTO: 91 FL (ref 80–100)
MONOCYTES # BLD AUTO: 0.32 X10*3/UL (ref 0.1–1)
MONOCYTES NFR BLD AUTO: 8.8 %
NEUTROPHILS # BLD AUTO: 2.4 X10*3/UL (ref 1.2–7.7)
NEUTROPHILS NFR BLD AUTO: 66.2 %
PLATELET # BLD AUTO: 182 X10*3/UL (ref 150–450)
RBC # BLD AUTO: 4.3 X10*6/UL (ref 4–5.2)
WBC # BLD AUTO: 3.6 X10*3/UL (ref 4.4–11.3)

## 2024-12-20 PROCEDURE — 36415 COLL VENOUS BLD VENIPUNCTURE: CPT

## 2024-12-20 PROCEDURE — 85247 CLOT FACTOR VIII MULTIMETRIC: CPT

## 2024-12-20 PROCEDURE — 85397 CLOTTING FUNCT ACTIVITY: CPT

## 2024-12-20 PROCEDURE — 85245 CLOT FACTOR VIII VW RISTOCTN: CPT

## 2024-12-20 PROCEDURE — 85246 CLOT FACTOR VIII VW ANTIGEN: CPT

## 2024-12-20 PROCEDURE — 99214 OFFICE O/P EST MOD 30 MIN: CPT | Performed by: INTERNAL MEDICINE

## 2024-12-20 PROCEDURE — 85025 COMPLETE CBC W/AUTO DIFF WBC: CPT

## 2024-12-20 ASSESSMENT — PAIN SCALES - GENERAL: PAINLEVEL_OUTOF10: 0-NO PAIN

## 2024-12-20 NOTE — PROGRESS NOTES
Patient ID: Cris Walker is a 39 y.o. female.  Referring Physician: No referring provider defined for this encounter.  Primary Care Provider: Juliana Rangel DO  Visit Type: Follow Up      Subjective    HPI I came back because I want to make sure I don't have von Willebrands deficiency    Review of Systems   Constitutional: Negative.    HENT:  Negative.     Eyes: Negative.    Respiratory: Negative.     Cardiovascular: Negative.    Gastrointestinal: Negative.    Endocrine: Negative.    Genitourinary:  Positive for vaginal bleeding.    Musculoskeletal: Negative.    Skin: Negative.    Neurological: Negative.    Hematological:  Bruises/bleeds easily.   Psychiatric/Behavioral: Negative.          Objective   BSA: 1.28 meters squared  /62 (BP Location: Right arm, Patient Position: Sitting, BP Cuff Size: Child)   Pulse 81   Temp 36.3 °C (97.3 °F) (Temporal)   Resp 16   Wt (!) 40 kg (88 lb 2.9 oz)   SpO2 100%   BMI 18.12 kg/m²      has a past medical history of Graves disease, Hashimoto's disease, History of uterine scar from previous surgery, Hypothyroidism, and Nausea and vomiting in pregnancy ([facility]) (2023).   has a past surgical history that includes  section, classic (02/15/2017); Bladder surgery; Ovary surgery (Right); and  section, low transverse.  Family History   Problem Relation Name Age of Onset    Diabetes Father       Oncology History    No history exists.       Cris Walker  reports that she has never smoked. She has never been exposed to tobacco smoke. She has never used smokeless tobacco.  She  reports no history of alcohol use.  She  reports no history of drug use.    Physical Exam  Vitals reviewed.   Constitutional:       Appearance: Normal appearance.   HENT:      Head: Normocephalic.      Mouth/Throat:      Mouth: Mucous membranes are moist.   Eyes:      Extraocular Movements: Extraocular movements intact.      Pupils: Pupils are equal, round, and reactive to  light.   Cardiovascular:      Rate and Rhythm: Normal rate and regular rhythm.      Pulses: Normal pulses.      Heart sounds: Normal heart sounds.   Pulmonary:      Effort: Pulmonary effort is normal.      Breath sounds: Normal breath sounds.   Abdominal:      General: Bowel sounds are normal.      Palpations: Abdomen is soft.   Musculoskeletal:         General: Normal range of motion.      Cervical back: Normal range of motion and neck supple.   Skin:     General: Skin is warm.   Neurological:      General: No focal deficit present.      Mental Status: She is alert and oriented to person, place, and time.   Psychiatric:         Mood and Affect: Mood normal.         Behavior: Behavior normal.         WBC   Date/Time Value Ref Range Status   07/12/2024 10:24 AM 3.9 (L) 4.4 - 11.3 x10*3/uL Final   06/21/2024 10:46 AM 4.2 (L) 4.4 - 11.3 x10*3/uL Final   01/05/2024 03:33 PM 5.6 4.4 - 11.3 x10*3/uL Final     nRBC   Date Value Ref Range Status   07/12/2024 0.0 0.0 - 0.0 /100 WBCs Final   06/21/2024 0.0 0.0 - 0.0 /100 WBCs Final   01/05/2024 0.0 0.0 - 0.0 /100 WBCs Final     RBC   Date Value Ref Range Status   07/12/2024 4.31 4.00 - 5.20 x10*6/uL Final   06/21/2024 4.34 4.00 - 5.20 x10*6/uL Final   01/05/2024 3.99 (L) 4.00 - 5.20 x10*6/uL Final     Hemoglobin   Date Value Ref Range Status   07/12/2024 12.7 12.0 - 16.0 g/dL Final   06/21/2024 12.8 12.0 - 16.0 g/dL Final   01/05/2024 11.8 (L) 12.0 - 16.0 g/dL Final     Hematocrit   Date Value Ref Range Status   07/12/2024 38.9 36.0 - 46.0 % Final   06/21/2024 40.1 36.0 - 46.0 % Final   01/05/2024 36.7 36.0 - 46.0 % Final     MCV   Date/Time Value Ref Range Status   07/12/2024 10:24 AM 90 80 - 100 fL Final   06/21/2024 10:46 AM 92 80 - 100 fL Final   01/05/2024 03:33 PM 92 80 - 100 fL Final     MCH   Date/Time Value Ref Range Status   07/12/2024 10:24 AM 29.5 26.0 - 34.0 pg Final   06/21/2024 10:46 AM 29.5 26.0 - 34.0 pg Final   01/05/2024 03:33 PM 29.6 26.0 - 34.0 pg Final  "    MCHC   Date/Time Value Ref Range Status   07/12/2024 10:24 AM 32.6 32.0 - 36.0 g/dL Final   06/21/2024 10:46 AM 31.9 (L) 32.0 - 36.0 g/dL Final   01/05/2024 03:33 PM 32.2 32.0 - 36.0 g/dL Final     RDW   Date/Time Value Ref Range Status   07/12/2024 10:24 AM 12.7 11.5 - 14.5 % Final   06/21/2024 10:46 AM 12.8 11.5 - 14.5 % Final   01/05/2024 03:33 PM 12.2 11.5 - 14.5 % Final     Platelets   Date/Time Value Ref Range Status   07/12/2024 10:24  150 - 450 x10*3/uL Final   06/21/2024 10:46  150 - 450 x10*3/uL Final   01/05/2024 03:33  150 - 450 x10*3/uL Final     No results found for: \"MPV\"  Neutrophils %   Date/Time Value Ref Range Status   07/12/2024 10:24 AM 55.0 40.0 - 80.0 % Final   06/21/2024 10:46 AM 67.6 40.0 - 80.0 % Final   07/26/2023 09:26 AM 61.0 40.0 - 80.0 % Final   06/01/2022 08:59 AM 62.0 40.0 - 80.0 % Final   12/21/2021 11:38 PM 47.3 40.0 - 80.0 % Final     Immature Granulocytes %, Automated   Date/Time Value Ref Range Status   07/12/2024 10:24 AM 0.3 0.0 - 0.9 % Final     Comment:     Immature Granulocyte Count (IG) includes promyelocytes, myelocytes and metamyelocytes but does not include bands. Percent differential counts (%) should be interpreted in the context of the absolute cell counts (cells/UL).   06/21/2024 10:46 AM 0.0 0.0 - 0.9 % Final     Comment:     Immature Granulocyte Count (IG) includes promyelocytes, myelocytes and metamyelocytes but does not include bands. Percent differential counts (%) should be interpreted in the context of the absolute cell counts (cells/UL).   07/26/2023 09:26 AM 0.0 0.0 - 0.9 % Final     Comment:      Immature Granulocyte Count (IG) includes promyelocytes,    myelocytes and metamyelocytes but does not include bands.   Percent differential counts (%) should be interpreted in the   context of the absolute cell counts (cells/L).     06/01/2022 08:59 AM 0.2 0.0 - 0.9 % Final     Comment:      Immature Granulocyte Count (IG) includes " promyelocytes,    myelocytes and metamyelocytes but does not include bands.   Percent differential counts (%) should be interpreted in the   context of the absolute cell counts (cells/L).     12/21/2021 11:38 PM 0.3 0.0 - 0.9 % Final     Comment:      Immature Granulocyte Count (IG) includes promyelocytes,    myelocytes and metamyelocytes but does not include bands.   Percent differential counts (%) should be interpreted in the   context of the absolute cell counts (cells/L).       Lymphocytes %   Date/Time Value Ref Range Status   07/12/2024 10:24 AM 32.8 13.0 - 44.0 % Final   06/21/2024 10:46 AM 21.8 13.0 - 44.0 % Final   07/26/2023 09:26 AM 28.3 13.0 - 44.0 % Final   06/01/2022 08:59 AM 26.9 13.0 - 44.0 % Final   12/21/2021 11:38 PM 42.2 13.0 - 44.0 % Final     Monocytes %   Date/Time Value Ref Range Status   07/12/2024 10:24 AM 9.3 2.0 - 10.0 % Final   06/21/2024 10:46 AM 8.5 2.0 - 10.0 % Final   07/26/2023 09:26 AM 8.5 2.0 - 10.0 % Final   06/01/2022 08:59 AM 8.4 2.0 - 10.0 % Final   12/21/2021 11:38 PM 8.3 2.0 - 10.0 % Final     Eosinophils %   Date/Time Value Ref Range Status   07/12/2024 10:24 AM 1.8 0.0 - 6.0 % Final   06/21/2024 10:46 AM 1.4 0.0 - 6.0 % Final   07/26/2023 09:26 AM 1.6 0.0 - 6.0 % Final   06/01/2022 08:59 AM 1.6 0.0 - 6.0 % Final   12/21/2021 11:38 PM 1.2 0.0 - 6.0 % Final     Basophils %   Date/Time Value Ref Range Status   07/12/2024 10:24 AM 0.8 0.0 - 2.0 % Final   06/21/2024 10:46 AM 0.7 0.0 - 2.0 % Final   07/26/2023 09:26 AM 0.6 0.0 - 2.0 % Final   06/01/2022 08:59 AM 0.9 0.0 - 2.0 % Final   12/21/2021 11:38 PM 0.7 0.0 - 2.0 % Final     Neutrophils Absolute   Date/Time Value Ref Range Status   07/12/2024 10:24 AM 2.13 1.20 - 7.70 x10*3/uL Final     Comment:     Percent differential counts (%) should be interpreted in the context of the absolute cell counts (cells/uL).   06/21/2024 10:46 AM 2.85 1.20 - 7.70 x10*3/uL Final     Comment:     Percent differential counts (%) should be  "interpreted in the context of the absolute cell counts (cells/uL).   07/26/2023 09:26 AM 3.02 1.20 - 7.70 x10E9/L Final   06/01/2022 08:59 AM 2.67 1.20 - 7.70 x10E9/L Final   12/21/2021 11:38 PM 3.53 1.20 - 7.70 x10E9/L Final     Immature Granulocytes Absolute, Automated   Date/Time Value Ref Range Status   07/12/2024 10:24 AM 0.01 0.00 - 0.70 x10*3/uL Final   06/21/2024 10:46 AM 0.00 0.00 - 0.70 x10*3/uL Final     Lymphocytes Absolute   Date/Time Value Ref Range Status   07/12/2024 10:24 AM 1.27 1.20 - 4.80 x10*3/uL Final   06/21/2024 10:46 AM 0.92 (L) 1.20 - 4.80 x10*3/uL Final   07/26/2023 09:26 AM 1.40 1.20 - 4.80 x10E9/L Final   06/01/2022 08:59 AM 1.16 (L) 1.20 - 4.80 x10E9/L Final   12/21/2021 11:38 PM 3.15 1.20 - 4.80 x10E9/L Final     Monocytes Absolute   Date/Time Value Ref Range Status   07/12/2024 10:24 AM 0.36 0.10 - 1.00 x10*3/uL Final   06/21/2024 10:46 AM 0.36 0.10 - 1.00 x10*3/uL Final   07/26/2023 09:26 AM 0.42 0.10 - 1.00 x10E9/L Final   06/01/2022 08:59 AM 0.36 0.10 - 1.00 x10E9/L Final   12/21/2021 11:38 PM 0.62 0.10 - 1.00 x10E9/L Final     Eosinophils Absolute   Date/Time Value Ref Range Status   07/12/2024 10:24 AM 0.07 0.00 - 0.70 x10*3/uL Final   06/21/2024 10:46 AM 0.06 0.00 - 0.70 x10*3/uL Final   07/26/2023 09:26 AM 0.08 0.00 - 0.70 x10E9/L Final   06/01/2022 08:59 AM 0.07 0.00 - 0.70 x10E9/L Final   12/21/2021 11:38 PM 0.09 0.00 - 0.70 x10E9/L Final     Basophils Absolute   Date/Time Value Ref Range Status   07/12/2024 10:24 AM 0.03 0.00 - 0.10 x10*3/uL Final   06/21/2024 10:46 AM 0.03 0.00 - 0.10 x10*3/uL Final   07/26/2023 09:26 AM 0.03 0.00 - 0.10 x10E9/L Final   06/01/2022 08:59 AM 0.04 0.00 - 0.10 x10E9/L Final   12/21/2021 11:38 PM 0.05 0.00 - 0.10 x10E9/L Final       No components found for: \"PT\"  aPTT   Date/Time Value Ref Range Status   07/26/2023 09:26 AM 35 27 - 38 sec Final     Comment:     Note new reference range as of 6/20/2023 at 10:00am.   05/31/2023 09:33 AM 31 26 - 39 " sec Final     Comment:       THE APTT IS NO LONGER USED FOR MONITORING     UNFRACTIONATED HEPARIN THERAPY.    FOR MONITORING HEPARIN THERAPY,     USE THE HEPARIN ASSAY.     Medication Documentation Review Audit       Reviewed by Helen Stoddard MA (Medical Assistant) on 12/20/24 at 1012      Medication Order Taking? Sig Documenting Provider Last Dose Status   cefdinir (Omnicef) 300 mg capsule [id number] No Take 1 capsule (300 mg) by mouth 2 times a day.   Patient not taking: Reported on 12/20/2024    Historical Provider, MD Not Taking Active   neomycin-polymyxin B-dexameth (Polydex) 3.5 mg/g-10,000 unit/g-0.1 % ointment ophthalmic ointment [id number] Yes APPLY TO ALL FOUR EYELIDS FOUR TIMES A DAY Historical Provider, MD Taking Active   prenatal no.139-iron-folic-dha 33 mg iron- 800 mcg-350 mg combo pack [id number] Yes Take by mouth. Historical Provider, MD Taking Active   SUMAtriptan (Imitrex) 50 mg tablet [id number] Yes Take 1 tablet (50 mg) by mouth 1 time if needed for migraine. May repeat dose once in 2 hours if no relief.  Do not exceed 2 doses in 24 hours.   Patient taking differently: Take 1 tablet (50 mg) by mouth 1 time if needed for migraine. May repeat dose once in 2 hours if no relief.  Do not exceed 2 doses in 24 hours.    Juliana Rangel, DO  Active                   Assessment/Plan    1) heavy menses  -she was last seen on 8/18/2023  -since a teenager, her periods have been heavy and could last up to 15 days  -2 maternal aunts also had heavy periods  -she has O blood type  -at time of last screen, she had a positive pregnancy test  -pregnancy (though not really at the beginning of pregnancy) and heavy menses can normalize von Willebrand levels  -her last period was completed about a week ago  -she is requesting re-screen for von Willebrands deficiency  -will check CBC, von willebrand antigen, von willebrand multimers, von Willebrand factor activity, and vWF GP1bM activity    2) migraines  -on sumatriptan      Problem List Items Addressed This Visit    None  Visit Diagnoses         Codes    Menometrorrhagia    -  Primary N92.1    Relevant Orders    CBC and Auto Differential (Completed)    Von Willebrand Antigen (Completed)    Von Willebrand Multimers    VWF GP1bM Activity    von Willebrand Factor, Activity (Ristocetin Cofactor) (Completed)    Clinic Appointment Request Other (comment) (Telephone followup); LEANDRO DIANE; [facility]ON                 Leandro Diane MD

## 2024-12-20 NOTE — PATIENT INSTRUCTIONS
"We will recheck the \"von Willebrands screen today\"    We can do a phone followup in a couple weeks  "

## 2024-12-23 LAB
VWF AG ACT/NOR PPP IA: 107 % (ref 50–220)
VWF:RCO ACT/NOR PPP PL AGG: 101 % (ref 51–215)

## 2024-12-29 PROBLEM — N92.1 MENOMETRORRHAGIA: Status: ACTIVE | Noted: 2024-12-29

## 2024-12-29 PROBLEM — G43.909 MIGRAINE WITHOUT STATUS MIGRAINOSUS, NOT INTRACTABLE: Status: ACTIVE | Noted: 2024-12-29

## 2024-12-29 ASSESSMENT — ENCOUNTER SYMPTOMS
NEUROLOGICAL NEGATIVE: 1
CARDIOVASCULAR NEGATIVE: 1
BRUISES/BLEEDS EASILY: 1
MUSCULOSKELETAL NEGATIVE: 1
ENDOCRINE NEGATIVE: 1
PSYCHIATRIC NEGATIVE: 1
RESPIRATORY NEGATIVE: 1
CONSTITUTIONAL NEGATIVE: 1
EYES NEGATIVE: 1
GASTROINTESTINAL NEGATIVE: 1

## 2024-12-30 LAB — VWF MULTIMERS PPP IB: NORMAL

## 2024-12-31 LAB — VWF GP1BM ACTIVITY: 78 IU/DL (ref 52–180)

## 2025-01-03 ENCOUNTER — TELEMEDICINE (OUTPATIENT)
Dept: HEMATOLOGY/ONCOLOGY | Facility: CLINIC | Age: 40
End: 2025-01-03
Payer: COMMERCIAL

## 2025-01-03 DIAGNOSIS — N92.1 MENOMETRORRHAGIA: Primary | ICD-10-CM

## 2025-01-03 DIAGNOSIS — G43.809 OTHER MIGRAINE WITHOUT STATUS MIGRAINOSUS, NOT INTRACTABLE: ICD-10-CM

## 2025-01-03 NOTE — PROGRESS NOTES
Patient ID: Cris Walker is a 39 y.o. female.  Referring Physician: Leandro Diane MD  [address]  [place] [address]  Primary Care Provider: Juliana Rangel DO  Visit Type:  Follow Up     Verbal consent was requested and obtained from patient on this date for a telehealth visit.    Subjective    HPI How was my labwork?    Review of Systems   Constitutional: Negative.    HENT:  Negative.     Eyes: Negative.    Respiratory: Negative.     Cardiovascular: Negative.    Gastrointestinal: Negative.    Endocrine: Negative.    Genitourinary:  Positive for vaginal bleeding.    Skin: Negative.    Neurological: Negative.    Hematological: Negative.    Psychiatric/Behavioral: Negative.          Objective   BSA: There is no height or weight on file to calculate BSA.  There were no vitals taken for this visit.     has a past medical history of Graves disease, Hashimoto's disease, History of uterine scar from previous surgery, Hypothyroidism, and Nausea and vomiting in pregnancy ([facility]) (2023).   has a past surgical history that includes  section, classic (02/15/2017); Bladder surgery; Ovary surgery (Right); and  section, low transverse.  Family History   Problem Relation Name Age of Onset    Diabetes Father       Oncology History    No history exists.       Cris Walker  reports that she has never smoked. She has never been exposed to tobacco smoke. She has never used smokeless tobacco.  She  reports no history of alcohol use.  She  reports no history of drug use.    Physical Exam    WBC   Date/Time Value Ref Range Status   2024 10:36 AM 3.6 (L) 4.4 - 11.3 x10*3/uL Final   2024 10:24 AM 3.9 (L) 4.4 - 11.3 x10*3/uL Final   2024 10:46 AM 4.2 (L) 4.4 - 11.3 x10*3/uL Final     nRBC   Date Value Ref Range Status   2024 0.0 0.0 - 0.0 /100 WBCs Final   2024 0.0 0.0 - 0.0 /100 WBCs Final   2024 0.0 0.0 - 0.0 /100 WBCs Final     RBC   Date Value Ref  "Range Status   12/20/2024 4.30 4.00 - 5.20 x10*6/uL Final   07/12/2024 4.31 4.00 - 5.20 x10*6/uL Final   06/21/2024 4.34 4.00 - 5.20 x10*6/uL Final     Hemoglobin   Date Value Ref Range Status   12/20/2024 12.6 12.0 - 16.0 g/dL Final   07/12/2024 12.7 12.0 - 16.0 g/dL Final   06/21/2024 12.8 12.0 - 16.0 g/dL Final     Hematocrit   Date Value Ref Range Status   12/20/2024 38.9 36.0 - 46.0 % Final   07/12/2024 38.9 36.0 - 46.0 % Final   06/21/2024 40.1 36.0 - 46.0 % Final     MCV   Date/Time Value Ref Range Status   12/20/2024 10:36 AM 91 80 - 100 fL Final   07/12/2024 10:24 AM 90 80 - 100 fL Final   06/21/2024 10:46 AM 92 80 - 100 fL Final     MCH   Date/Time Value Ref Range Status   12/20/2024 10:36 AM 29.3 26.0 - 34.0 pg Final   07/12/2024 10:24 AM 29.5 26.0 - 34.0 pg Final   06/21/2024 10:46 AM 29.5 26.0 - 34.0 pg Final     MCHC   Date/Time Value Ref Range Status   12/20/2024 10:36 AM 32.4 32.0 - 36.0 g/dL Final   07/12/2024 10:24 AM 32.6 32.0 - 36.0 g/dL Final   06/21/2024 10:46 AM 31.9 (L) 32.0 - 36.0 g/dL Final     RDW   Date/Time Value Ref Range Status   12/20/2024 10:36 AM 13.0 11.5 - 14.5 % Final   07/12/2024 10:24 AM 12.7 11.5 - 14.5 % Final   06/21/2024 10:46 AM 12.8 11.5 - 14.5 % Final     Platelets   Date/Time Value Ref Range Status   12/20/2024 10:36  150 - 450 x10*3/uL Final   07/12/2024 10:24  150 - 450 x10*3/uL Final   06/21/2024 10:46  150 - 450 x10*3/uL Final     No results found for: \"MPV\"  Neutrophils %   Date/Time Value Ref Range Status   12/20/2024 10:36 AM 66.2 40.0 - 80.0 % Final   07/12/2024 10:24 AM 55.0 40.0 - 80.0 % Final   06/21/2024 10:46 AM 67.6 40.0 - 80.0 % Final     Immature Granulocytes %, Automated   Date/Time Value Ref Range Status   12/20/2024 10:36 AM 0.3 0.0 - 0.9 % Final     Comment:     Immature Granulocyte Count (IG) includes promyelocytes, myelocytes and metamyelocytes but does not include bands. Percent differential counts (%) should be interpreted in " the context of the absolute cell counts (cells/UL).   07/12/2024 10:24 AM 0.3 0.0 - 0.9 % Final     Comment:     Immature Granulocyte Count (IG) includes promyelocytes, myelocytes and metamyelocytes but does not include bands. Percent differential counts (%) should be interpreted in the context of the absolute cell counts (cells/UL).   06/21/2024 10:46 AM 0.0 0.0 - 0.9 % Final     Comment:     Immature Granulocyte Count (IG) includes promyelocytes, myelocytes and metamyelocytes but does not include bands. Percent differential counts (%) should be interpreted in the context of the absolute cell counts (cells/UL).     Lymphocytes %   Date/Time Value Ref Range Status   12/20/2024 10:36 AM 22.7 13.0 - 44.0 % Final   07/12/2024 10:24 AM 32.8 13.0 - 44.0 % Final   06/21/2024 10:46 AM 21.8 13.0 - 44.0 % Final     Monocytes %   Date/Time Value Ref Range Status   12/20/2024 10:36 AM 8.8 2.0 - 10.0 % Final   07/12/2024 10:24 AM 9.3 2.0 - 10.0 % Final   06/21/2024 10:46 AM 8.5 2.0 - 10.0 % Final     Eosinophils %   Date/Time Value Ref Range Status   12/20/2024 10:36 AM 1.4 0.0 - 6.0 % Final   07/12/2024 10:24 AM 1.8 0.0 - 6.0 % Final   06/21/2024 10:46 AM 1.4 0.0 - 6.0 % Final     Basophils %   Date/Time Value Ref Range Status   12/20/2024 10:36 AM 0.6 0.0 - 2.0 % Final   07/12/2024 10:24 AM 0.8 0.0 - 2.0 % Final   06/21/2024 10:46 AM 0.7 0.0 - 2.0 % Final     Neutrophils Absolute   Date/Time Value Ref Range Status   12/20/2024 10:36 AM 2.40 1.20 - 7.70 x10*3/uL Final     Comment:     Percent differential counts (%) should be interpreted in the context of the absolute cell counts (cells/uL).   07/12/2024 10:24 AM 2.13 1.20 - 7.70 x10*3/uL Final     Comment:     Percent differential counts (%) should be interpreted in the context of the absolute cell counts (cells/uL).   06/21/2024 10:46 AM 2.85 1.20 - 7.70 x10*3/uL Final     Comment:     Percent differential counts (%) should be interpreted in the context of the absolute cell  "counts (cells/uL).     Immature Granulocytes Absolute, Automated   Date/Time Value Ref Range Status   12/20/2024 10:36 AM 0.01 0.00 - 0.70 x10*3/uL Final   07/12/2024 10:24 AM 0.01 0.00 - 0.70 x10*3/uL Final   06/21/2024 10:46 AM 0.00 0.00 - 0.70 x10*3/uL Final     Lymphocytes Absolute   Date/Time Value Ref Range Status   12/20/2024 10:36 AM 0.82 (L) 1.20 - 4.80 x10*3/uL Final   07/12/2024 10:24 AM 1.27 1.20 - 4.80 x10*3/uL Final   06/21/2024 10:46 AM 0.92 (L) 1.20 - 4.80 x10*3/uL Final     Monocytes Absolute   Date/Time Value Ref Range Status   12/20/2024 10:36 AM 0.32 0.10 - 1.00 x10*3/uL Final   07/12/2024 10:24 AM 0.36 0.10 - 1.00 x10*3/uL Final   06/21/2024 10:46 AM 0.36 0.10 - 1.00 x10*3/uL Final     Eosinophils Absolute   Date/Time Value Ref Range Status   12/20/2024 10:36 AM 0.05 0.00 - 0.70 x10*3/uL Final   07/12/2024 10:24 AM 0.07 0.00 - 0.70 x10*3/uL Final   06/21/2024 10:46 AM 0.06 0.00 - 0.70 x10*3/uL Final     Basophils Absolute   Date/Time Value Ref Range Status   12/20/2024 10:36 AM 0.02 0.00 - 0.10 x10*3/uL Final   07/12/2024 10:24 AM 0.03 0.00 - 0.10 x10*3/uL Final   06/21/2024 10:46 AM 0.03 0.00 - 0.10 x10*3/uL Final       No components found for: \"PT\"  aPTT   Date/Time Value Ref Range Status   07/26/2023 09:26 AM 35 27 - 38 sec Final     Comment:     Note new reference range as of 6/20/2023 at 10:00am.   05/31/2023 09:33 AM 31 26 - 39 sec Final     Comment:       THE APTT IS NO LONGER USED FOR MONITORING     UNFRACTIONATED HEPARIN THERAPY.    FOR MONITORING HEPARIN THERAPY,     USE THE HEPARIN ASSAY.     Medication Documentation Review Audit       Reviewed by Helen Stoddard MA (Medical Assistant) on 12/20/24 at 1012      Medication Order Taking? Sig Documenting Provider Last Dose Status   cefdinir (Omnicef) 300 mg capsule [id number] No Take 1 capsule (300 mg) by mouth 2 times a day.   Patient not taking: Reported on 12/20/2024    Historical Provider, MD Not Taking Active   neomycin-polymyxin " B-dexameth (Polydex) 3.5 mg/g-10,000 unit/g-0.1 % ointment ophthalmic ointment [id number] Yes APPLY TO ALL FOUR EYELIDS FOUR TIMES A DAY Historical Provider, MD Taking Active   prenatal no.139-iron-folic-dha 33 mg iron- 800 mcg-350 mg combo pack [id number] Yes Take by mouth. Historical Provider, MD Taking Active   SUMAtriptan (Imitrex) 50 mg tablet [id number] Yes Take 1 tablet (50 mg) by mouth 1 time if needed for migraine. May repeat dose once in 2 hours if no relief.  Do not exceed 2 doses in 24 hours.   Patient taking differently: Take 1 tablet (50 mg) by mouth 1 time if needed for migraine. May repeat dose once in 2 hours if no relief.  Do not exceed 2 doses in 24 hours.    Juliana Rangel, DO  Active                   Assessment/Plan    1) heavy menses  -she was last seen on 8/18/2023  -since a teenager, her periods have been heavy and could last up to 15 days  -2 maternal aunts also had heavy periods  -she has O blood type  -at time of last screen, she had a positive pregnancy test  -pregnancy (though not really at the beginning of pregnancy) and heavy menses can normalize von Willebrand levels  -her last period was completed about a week ago  -she is requesting re-screen for von Willebrands deficiency  -will check CBC, von willebrand antigen, von willebrand multimers, von Willebrand factor activity, and vWF GP1bM activity  -here for interval followup via telephone  -von willebrand antigen 107% (normal)  -von Wilelbrand multimers: normal pattern and distribution of bands  -VWF GP1bM activity 78% (normal)  -vWF ristocetin cofactor 101% (normal)  -there is no evidence of von Willebrands deficiency  -continues with very heavy, prolonged menstrual periods  -she has been offered OCPs, hysterectomy and ablation  -she will discuss with OB-Gyn about lysteda     2) migraines  -on sumatriptan          Problem List Items Addressed This Visit             ICD-10-CM    Menometrorrhagia N92.1            Leandro Diane,  MD

## 2025-01-04 ASSESSMENT — ENCOUNTER SYMPTOMS
RESPIRATORY NEGATIVE: 1
CARDIOVASCULAR NEGATIVE: 1
EYES NEGATIVE: 1
PSYCHIATRIC NEGATIVE: 1
HEMATOLOGIC/LYMPHATIC NEGATIVE: 1
GASTROINTESTINAL NEGATIVE: 1
ENDOCRINE NEGATIVE: 1
NEUROLOGICAL NEGATIVE: 1
CONSTITUTIONAL NEGATIVE: 1

## 2025-02-14 ENCOUNTER — TELEPHONE (OUTPATIENT)
Dept: OBSTETRICS AND GYNECOLOGY | Facility: CLINIC | Age: 40
End: 2025-02-14
Payer: COMMERCIAL

## 2025-02-14 NOTE — TELEPHONE ENCOUNTER
Patient has not been seen in almost a year and would need an appointment for a medication to be prescribed. Please schedule patient ASAP.

## 2025-02-14 NOTE — TELEPHONE ENCOUNTER
Patient would like a medication called in for yeast infection.  Please advise   Alex Billy [place].

## 2025-03-18 ENCOUNTER — APPOINTMENT (OUTPATIENT)
Dept: OBSTETRICS AND GYNECOLOGY | Facility: CLINIC | Age: 40
End: 2025-03-18
Payer: COMMERCIAL

## 2025-03-18 VITALS
HEIGHT: 59 IN | HEART RATE: 62 BPM | BODY MASS INDEX: 17.54 KG/M2 | OXYGEN SATURATION: 99 % | DIASTOLIC BLOOD PRESSURE: 66 MMHG | WEIGHT: 87 LBS | SYSTOLIC BLOOD PRESSURE: 105 MMHG

## 2025-03-18 DIAGNOSIS — N93.9 ABNORMAL UTERINE BLEEDING (AUB): Primary | ICD-10-CM

## 2025-03-18 DIAGNOSIS — R10.2 PELVIC PAIN IN FEMALE: ICD-10-CM

## 2025-03-18 PROCEDURE — 99213 OFFICE O/P EST LOW 20 MIN: CPT | Performed by: OBSTETRICS & GYNECOLOGY

## 2025-03-18 PROCEDURE — 3008F BODY MASS INDEX DOCD: CPT | Performed by: OBSTETRICS & GYNECOLOGY

## 2025-03-18 RX ORDER — LEVONORGESTREL/ETHIN.ESTRADIOL 0.1-0.02MG
1 TABLET ORAL DAILY
Qty: 90 TABLET | Refills: 3 | Status: SHIPPED | OUTPATIENT
Start: 2025-03-18 | End: 2026-03-18

## 2025-03-18 RX ORDER — POLYVINYL ALCOHOL, POVIDONE 14; 6 MG/ML; MG/ML
SOLUTION/ DROPS OPHTHALMIC
COMMUNITY
Start: 2025-01-08

## 2025-03-18 RX ORDER — CARBOXYMETHYLCELLULOSE SODIUM, GLYCERIN AND POLYSORBATE 80 5; 10; 5 MG/ML; MG/ML; MG/ML
SOLUTION/ DROPS OPHTHALMIC
COMMUNITY
Start: 2025-01-08

## 2025-03-18 ASSESSMENT — ENCOUNTER SYMPTOMS
RESPIRATORY NEGATIVE: 1
ENDOCRINE NEGATIVE: 1
CONSTITUTIONAL NEGATIVE: 1
EYES NEGATIVE: 1
GASTROINTESTINAL NEGATIVE: 1
CARDIOVASCULAR NEGATIVE: 1

## 2025-03-18 NOTE — PROGRESS NOTES
Subjective   Patient ID: Cris Walker is a 39 y.o. female who presents for Follow-up.    HPI  38 y/o  presenting with right sided pelvic pain, new over past few months. Feels like a tugging and pulling.  Also having heavier periods and menstrual migraines.      Review of Systems   Constitutional: Negative.    HENT: Negative.     Eyes: Negative.    Respiratory: Negative.     Cardiovascular: Negative.    Gastrointestinal: Negative.    Endocrine: Negative.    Genitourinary:  Positive for pelvic pain and vaginal bleeding.     Objective   Physical Exam  Gen in NAD  Abdomen soft, nontender, no rebound or guarding  Pelvic deferred    Assessment/Plan   38 y/o  presenting for discussion of pelvic pain and AUB.  Pain seems scar tissue related as is a tugging/pulling sensation in her RLQ.  Will check TVUS to r/o ovarian cyst as this ia newer pain and her last  section was almost 5 years ago now.  Also c/o AUB and menstrual migraines (no aura).  Has no contraindication to COCs, will send to pharmacy and take continuously to see if this helps her migraines.  Asked about hysterectomy, discussed she would need to be 100% sure she is done with childbearing and would send to [facility] given h/o 5 prior  sections and extensive scar tissue at time of her 5th section which was complicated by a bladder injury.  Will follow up after TVUS results return.    Jaclyn Ayon MD 25 9:18 AM

## 2025-03-29 ENCOUNTER — HOSPITAL ENCOUNTER (OUTPATIENT)
Dept: RADIOLOGY | Facility: HOSPITAL | Age: 40
Discharge: HOME | End: 2025-03-29
Payer: COMMERCIAL

## 2025-03-29 DIAGNOSIS — N93.9 ABNORMAL UTERINE BLEEDING (AUB): ICD-10-CM

## 2025-03-29 PROCEDURE — 76830 TRANSVAGINAL US NON-OB: CPT | Performed by: STUDENT IN AN ORGANIZED HEALTH CARE EDUCATION/TRAINING PROGRAM

## 2025-03-29 PROCEDURE — 76856 US EXAM PELVIC COMPLETE: CPT | Performed by: STUDENT IN AN ORGANIZED HEALTH CARE EDUCATION/TRAINING PROGRAM

## 2025-03-29 PROCEDURE — 76830 TRANSVAGINAL US NON-OB: CPT

## 2025-04-22 ENCOUNTER — TELEPHONE (OUTPATIENT)
Dept: OBSTETRICS AND GYNECOLOGY | Facility: CLINIC | Age: 40
End: 2025-04-22

## 2025-04-22 NOTE — TELEPHONE ENCOUNTER
PT is concerned because she has been bleeding for more than a month and feels like her periods have gotten worse.

## 2025-04-28 NOTE — TELEPHONE ENCOUNTER
I called and spoke with the patient. Patient advised to give it time. If no improvement in 3 months to call and we can reevaluate.

## 2025-05-02 ENCOUNTER — APPOINTMENT (OUTPATIENT)
Dept: PRIMARY CARE | Facility: CLINIC | Age: 40
End: 2025-05-02
Payer: COMMERCIAL

## 2025-05-19 ENCOUNTER — APPOINTMENT (OUTPATIENT)
Dept: PRIMARY CARE | Facility: CLINIC | Age: 40
End: 2025-05-19
Payer: COMMERCIAL

## 2025-05-19 ENCOUNTER — APPOINTMENT (OUTPATIENT)
Dept: URGENT CARE | Age: 40
End: 2025-05-19
Payer: COMMERCIAL

## 2025-05-19 ENCOUNTER — OFFICE VISIT (OUTPATIENT)
Dept: PRIMARY CARE | Facility: CLINIC | Age: 40
End: 2025-05-19
Payer: COMMERCIAL

## 2025-05-19 VITALS
SYSTOLIC BLOOD PRESSURE: 110 MMHG | WEIGHT: 89 LBS | DIASTOLIC BLOOD PRESSURE: 68 MMHG | BODY MASS INDEX: 18.28 KG/M2 | TEMPERATURE: 97.5 F

## 2025-05-19 DIAGNOSIS — G43.019 INTRACTABLE MIGRAINE WITHOUT AURA AND WITHOUT STATUS MIGRAINOSUS: ICD-10-CM

## 2025-05-19 DIAGNOSIS — R39.9 UTI SYMPTOMS: Primary | ICD-10-CM

## 2025-05-19 LAB
POC BILIRUBIN, URINE: NEGATIVE
POC BLOOD, URINE: ABNORMAL
POC GLUCOSE, URINE: NEGATIVE MG/DL
POC KETONES, URINE: NEGATIVE MG/DL
POC LEUKOCYTES, URINE: ABNORMAL
POC NITRITE,URINE: NEGATIVE
POC PH, URINE: 6 PH
POC PROTEIN, URINE: NEGATIVE MG/DL
POC SPECIFIC GRAVITY, URINE: 1.01
POC UROBILINOGEN, URINE: 0.2 EU/DL

## 2025-05-19 PROCEDURE — 99213 OFFICE O/P EST LOW 20 MIN: CPT | Performed by: FAMILY MEDICINE

## 2025-05-19 PROCEDURE — 81003 URINALYSIS AUTO W/O SCOPE: CPT | Performed by: FAMILY MEDICINE

## 2025-05-19 PROCEDURE — 1036F TOBACCO NON-USER: CPT | Performed by: FAMILY MEDICINE

## 2025-05-19 RX ORDER — SUMATRIPTAN SUCCINATE 50 MG/1
50 TABLET ORAL ONCE AS NEEDED
Start: 2025-05-19

## 2025-05-19 RX ORDER — NITROFURANTOIN 25; 75 MG/1; MG/1
100 CAPSULE ORAL 2 TIMES DAILY
Qty: 14 CAPSULE | Refills: 0 | Status: SHIPPED | OUTPATIENT
Start: 2025-05-19 | End: 2025-05-26

## 2025-05-19 NOTE — PATIENT INSTRUCTIONS
I ordered antibiotics.  A urine culture was ordered today.  Drink plenty of water.  Return to see Dr Rangel if this persists in a week and sooner if this worsens.

## 2025-05-19 NOTE — PROGRESS NOTES
Subjective   Patient ID: [id number]     Cris Walker is a 39 y.o. female who presents for UTI Symptoms.  HPI  She complains of UTI symptoms.  This started Friday night.  Has pain with urination.  She has increased frequency and urgency.      No abdominal pain, back pain, fever.     She did notice blood in the urine this morning.   She had a UA today that showed RBC and leucocytes.     She has had UTIs before.  Feels similar.      Objective     /68   Temp 36.4 °C (97.5 °F) (Skin)   Wt (!) 40.4 kg (89 lb)   BMI 18.28 kg/m²      Physical Exam  Constitutional:       Appearance: Normal appearance.   Cardiovascular:      Rate and Rhythm: Normal rate and regular rhythm.      Heart sounds: Normal heart sounds. No murmur heard.  Pulmonary:      Effort: Pulmonary effort is normal. No respiratory distress.      Breath sounds: Normal breath sounds.   Abdominal:      General: Abdomen is flat.      Palpations: Abdomen is soft.      Tenderness: There is no abdominal tenderness. There is no guarding or rebound.      Comments: Grupo's sign neg.   Neurological:      General: No focal deficit present.      Mental Status: She is alert and oriented to person, place, and time.         Assessment/Plan   Problem List Items Addressed This Visit    None  Visit Diagnoses         UTI symptoms    -  Primary    Relevant Medications    nitrofurantoin, macrocrystal-monohydrate, (Macrobid) 100 mg capsule    Other Relevant Orders    POCT UA Automated manually resulted (Completed)    Urine Culture      Intractable migraine without aura and without status migrainosus        Relevant Medications    SUMAtriptan (Imitrex) 50 mg tablet        I ordered antibiotics.  A urine culture was ordered today.  Drink plenty of water.  Return to see Dr Rangel if this persists in a week and sooner if this worsens.      Yg Tate,

## 2025-05-21 LAB — BACTERIA UR CULT: NORMAL

## 2025-07-01 DIAGNOSIS — G43.019 INTRACTABLE MIGRAINE WITHOUT AURA AND WITHOUT STATUS MIGRAINOSUS: ICD-10-CM

## 2025-07-01 NOTE — TELEPHONE ENCOUNTER
Refill Sumatriptan 50mg as directed     Pharmacy: [facility]     LR: 05/19/25  LV: 05/19/25  No future appt scheduled

## 2025-07-02 RX ORDER — SUMATRIPTAN SUCCINATE 50 MG/1
50 TABLET ORAL ONCE AS NEEDED
Qty: 9 TABLET | Refills: 1 | Status: SHIPPED | OUTPATIENT
Start: 2025-07-02

## 2025-10-10 ENCOUNTER — APPOINTMENT (OUTPATIENT)
Dept: PRIMARY CARE | Facility: CLINIC | Age: 40
End: 2025-10-10
Payer: COMMERCIAL

## (undated) DEVICE — Device

## (undated) DEVICE — DEVICE, RESECTING, AVETA WAVE, 3.9MM

## (undated) DEVICE — ACCESSORY, FLUID MANAGEMENT, AVETA

## (undated) DEVICE — SOLUTION, IRRIGATION, SODIUM CHLORIDE 0.9%, 1000 ML, POUR BOTTLE

## (undated) DEVICE — SOLUTION, IRRIGATION, STERILE WATER, 1000 ML, POUR BOTTLE

## (undated) DEVICE — TOWEL PACK, STERILE, 4/PACK, BLUE

## (undated) DEVICE — ACCESSORY, AVETA, WASTE MANAGEMENT WITH CAP

## (undated) DEVICE — COLLECTION SET, VACURETTE, BERKLEY, 6 FT

## (undated) DEVICE — BRIEF, PANTY, MESH, XL, 2PK

## (undated) DEVICE — GLOVE, SURGICAL, PROTEXIS NEOPRENE, 6.5, PF, LF